# Patient Record
Sex: MALE | Race: WHITE | NOT HISPANIC OR LATINO
[De-identification: names, ages, dates, MRNs, and addresses within clinical notes are randomized per-mention and may not be internally consistent; named-entity substitution may affect disease eponyms.]

---

## 2017-04-24 ENCOUNTER — APPOINTMENT (OUTPATIENT)
Dept: CARDIOLOGY | Facility: CLINIC | Age: 62
End: 2017-04-24

## 2017-04-24 VITALS — HEART RATE: 69 BPM | HEIGHT: 68 IN | WEIGHT: 153 LBS | BODY MASS INDEX: 23.19 KG/M2

## 2017-04-24 LAB
INR PPP: 3.7 RATIO
QUALITY CONTROL: YES

## 2017-05-01 ENCOUNTER — MEDICATION RENEWAL (OUTPATIENT)
Age: 62
End: 2017-05-01

## 2017-05-03 ENCOUNTER — MEDICATION RENEWAL (OUTPATIENT)
Age: 62
End: 2017-05-03

## 2017-08-07 ENCOUNTER — APPOINTMENT (OUTPATIENT)
Dept: CARDIOLOGY | Facility: CLINIC | Age: 62
End: 2017-08-07
Payer: COMMERCIAL

## 2017-08-07 VITALS — BODY MASS INDEX: 23.19 KG/M2 | HEIGHT: 68 IN | HEART RATE: 69 BPM | WEIGHT: 153 LBS | OXYGEN SATURATION: 96 %

## 2017-08-07 LAB
INR PPP: 3.6 RATIO
QUALITY CONTROL: YES

## 2017-08-07 PROCEDURE — 99211 OFF/OP EST MAY X REQ PHY/QHP: CPT

## 2017-08-07 PROCEDURE — 85610 PROTHROMBIN TIME: CPT | Mod: QW

## 2017-08-21 ENCOUNTER — APPOINTMENT (OUTPATIENT)
Dept: CARDIOLOGY | Facility: CLINIC | Age: 62
End: 2017-08-21
Payer: COMMERCIAL

## 2017-08-21 VITALS — WEIGHT: 153 LBS | BODY MASS INDEX: 23.19 KG/M2 | HEART RATE: 69 BPM | HEIGHT: 68 IN

## 2017-08-21 LAB
INR PPP: 3.3 RATIO
QUALITY CONTROL: YES

## 2017-08-21 PROCEDURE — 85610 PROTHROMBIN TIME: CPT | Mod: QW

## 2017-08-21 PROCEDURE — 99211 OFF/OP EST MAY X REQ PHY/QHP: CPT

## 2017-08-22 ENCOUNTER — RX RENEWAL (OUTPATIENT)
Age: 62
End: 2017-08-22

## 2017-08-25 ENCOUNTER — MEDICATION RENEWAL (OUTPATIENT)
Age: 62
End: 2017-08-25

## 2017-08-28 ENCOUNTER — RX RENEWAL (OUTPATIENT)
Age: 62
End: 2017-08-28

## 2017-09-05 ENCOUNTER — APPOINTMENT (OUTPATIENT)
Dept: CARDIOLOGY | Facility: CLINIC | Age: 62
End: 2017-09-05
Payer: COMMERCIAL

## 2017-09-05 VITALS — HEIGHT: 68 IN | BODY MASS INDEX: 23.19 KG/M2 | WEIGHT: 153 LBS | HEART RATE: 69 BPM

## 2017-09-05 LAB
INR PPP: 2.5 RATIO
QUALITY CONTROL: YES

## 2017-09-05 PROCEDURE — 85610 PROTHROMBIN TIME: CPT | Mod: QW

## 2017-09-05 PROCEDURE — 99211 OFF/OP EST MAY X REQ PHY/QHP: CPT

## 2017-09-07 ENCOUNTER — RX RENEWAL (OUTPATIENT)
Age: 62
End: 2017-09-07

## 2017-09-25 ENCOUNTER — RX RENEWAL (OUTPATIENT)
Age: 62
End: 2017-09-25

## 2017-10-09 ENCOUNTER — NON-APPOINTMENT (OUTPATIENT)
Age: 62
End: 2017-10-09

## 2017-10-09 ENCOUNTER — APPOINTMENT (OUTPATIENT)
Dept: CARDIOLOGY | Facility: CLINIC | Age: 62
End: 2017-10-09
Payer: COMMERCIAL

## 2017-10-09 VITALS
HEIGHT: 68 IN | BODY MASS INDEX: 23.95 KG/M2 | WEIGHT: 158 LBS | HEART RATE: 76 BPM | DIASTOLIC BLOOD PRESSURE: 94 MMHG | OXYGEN SATURATION: 96 % | SYSTOLIC BLOOD PRESSURE: 137 MMHG

## 2017-10-09 VITALS — DIASTOLIC BLOOD PRESSURE: 80 MMHG | SYSTOLIC BLOOD PRESSURE: 120 MMHG

## 2017-10-09 DIAGNOSIS — I07.1 RHEUMATIC TRICUSPID INSUFFICIENCY: ICD-10-CM

## 2017-10-09 DIAGNOSIS — R06.02 SHORTNESS OF BREATH: ICD-10-CM

## 2017-10-09 DIAGNOSIS — Z86.73 PERSONAL HISTORY OF TRANSIENT ISCHEMIC ATTACK (TIA), AND CEREBRAL INFARCTION W/OUT RESIDUAL DEFICITS: ICD-10-CM

## 2017-10-09 DIAGNOSIS — R06.89 OTHER ABNORMALITIES OF BREATHING: ICD-10-CM

## 2017-10-09 DIAGNOSIS — I42.9 CARDIOMYOPATHY, UNSPECIFIED: ICD-10-CM

## 2017-10-09 LAB
INR PPP: 1.6 RATIO
QUALITY CONTROL: YES

## 2017-10-09 PROCEDURE — 99215 OFFICE O/P EST HI 40 MIN: CPT

## 2017-10-09 PROCEDURE — 93000 ELECTROCARDIOGRAM COMPLETE: CPT

## 2017-10-23 ENCOUNTER — APPOINTMENT (OUTPATIENT)
Dept: CARDIOLOGY | Facility: CLINIC | Age: 62
End: 2017-10-23
Payer: COMMERCIAL

## 2017-10-23 PROCEDURE — 93283 PRGRMG EVAL IMPLANTABLE DFB: CPT

## 2017-10-31 ENCOUNTER — TRANSCRIPTION ENCOUNTER (OUTPATIENT)
Age: 62
End: 2017-10-31

## 2017-10-31 ENCOUNTER — INPATIENT (INPATIENT)
Facility: HOSPITAL | Age: 62
LOS: 0 days | Discharge: HOME | DRG: 245 | End: 2017-11-01
Attending: INTERNAL MEDICINE | Admitting: INTERNAL MEDICINE
Payer: COMMERCIAL

## 2017-10-31 VITALS
HEART RATE: 71 BPM | RESPIRATION RATE: 18 BRPM | WEIGHT: 149.91 LBS | TEMPERATURE: 98 F | SYSTOLIC BLOOD PRESSURE: 155 MMHG | DIASTOLIC BLOOD PRESSURE: 98 MMHG | HEIGHT: 68 IN | OXYGEN SATURATION: 97 %

## 2017-10-31 DIAGNOSIS — Z45.02 ENCOUNTER FOR ADJUSTMENT AND MANAGEMENT OF AUTOMATIC IMPLANTABLE CARDIAC DEFIBRILLATOR: ICD-10-CM

## 2017-10-31 DIAGNOSIS — G45.9 TRANSIENT CEREBRAL ISCHEMIC ATTACK, UNSPECIFIED: ICD-10-CM

## 2017-10-31 DIAGNOSIS — Z95.810 PRESENCE OF AUTOMATIC (IMPLANTABLE) CARDIAC DEFIBRILLATOR: Chronic | ICD-10-CM

## 2017-10-31 LAB
ALBUMIN SERPL ELPH-MCNC: 4.5 G/DL — SIGNIFICANT CHANGE UP (ref 3.3–5)
ALP SERPL-CCNC: 91 U/L — SIGNIFICANT CHANGE UP (ref 40–120)
ALT FLD-CCNC: 25 U/L RC — SIGNIFICANT CHANGE UP (ref 10–45)
ANION GAP SERPL CALC-SCNC: 11 MMOL/L — SIGNIFICANT CHANGE UP (ref 5–17)
APTT BLD: 35.5 SEC — SIGNIFICANT CHANGE UP (ref 27.5–37.4)
AST SERPL-CCNC: 28 U/L — SIGNIFICANT CHANGE UP (ref 10–40)
BILIRUB SERPL-MCNC: 0.7 MG/DL — SIGNIFICANT CHANGE UP (ref 0.2–1.2)
BUN SERPL-MCNC: 15 MG/DL — SIGNIFICANT CHANGE UP (ref 7–23)
CALCIUM SERPL-MCNC: 9.6 MG/DL — SIGNIFICANT CHANGE UP (ref 8.4–10.5)
CHLORIDE SERPL-SCNC: 103 MMOL/L — SIGNIFICANT CHANGE UP (ref 96–108)
CO2 SERPL-SCNC: 27 MMOL/L — SIGNIFICANT CHANGE UP (ref 22–31)
CREAT SERPL-MCNC: 0.83 MG/DL — SIGNIFICANT CHANGE UP (ref 0.5–1.3)
GLUCOSE SERPL-MCNC: 131 MG/DL — HIGH (ref 70–99)
HCT VFR BLD CALC: 46.3 % — SIGNIFICANT CHANGE UP (ref 39–50)
HGB BLD-MCNC: 16.2 G/DL — SIGNIFICANT CHANGE UP (ref 13–17)
INR BLD: 1.89 RATIO — HIGH (ref 0.88–1.16)
MCHC RBC-ENTMCNC: 33.6 PG — SIGNIFICANT CHANGE UP (ref 27–34)
MCHC RBC-ENTMCNC: 35.1 GM/DL — SIGNIFICANT CHANGE UP (ref 32–36)
MCV RBC AUTO: 95.8 FL — SIGNIFICANT CHANGE UP (ref 80–100)
PLATELET # BLD AUTO: 191 K/UL — SIGNIFICANT CHANGE UP (ref 150–400)
POTASSIUM SERPL-MCNC: 4.8 MMOL/L — SIGNIFICANT CHANGE UP (ref 3.5–5.3)
POTASSIUM SERPL-SCNC: 4.8 MMOL/L — SIGNIFICANT CHANGE UP (ref 3.5–5.3)
PROT SERPL-MCNC: 8 G/DL — SIGNIFICANT CHANGE UP (ref 6–8.3)
PROTHROM AB SERPL-ACNC: 20.9 SEC — HIGH (ref 9.8–12.7)
RBC # BLD: 4.84 M/UL — SIGNIFICANT CHANGE UP (ref 4.2–5.8)
RBC # FLD: 12.3 % — SIGNIFICANT CHANGE UP (ref 10.3–14.5)
SODIUM SERPL-SCNC: 141 MMOL/L — SIGNIFICANT CHANGE UP (ref 135–145)
WBC # BLD: 9.1 K/UL — SIGNIFICANT CHANGE UP (ref 3.8–10.5)
WBC # FLD AUTO: 9.1 K/UL — SIGNIFICANT CHANGE UP (ref 3.8–10.5)

## 2017-10-31 PROCEDURE — 93010 ELECTROCARDIOGRAM REPORT: CPT

## 2017-10-31 PROCEDURE — 33263 RMVL & RPLCMT DFB GEN 2 LEAD: CPT

## 2017-10-31 RX ORDER — VANCOMYCIN HCL 1 G
1000 VIAL (EA) INTRAVENOUS EVERY 12 HOURS
Qty: 0 | Refills: 0 | Status: COMPLETED | OUTPATIENT
Start: 2017-10-31 | End: 2017-11-01

## 2017-10-31 RX ORDER — ASPIRIN/CALCIUM CARB/MAGNESIUM 324 MG
81 TABLET ORAL DAILY
Qty: 0 | Refills: 0 | Status: DISCONTINUED | OUTPATIENT
Start: 2017-10-31 | End: 2017-11-01

## 2017-10-31 RX ORDER — WARFARIN SODIUM 2.5 MG/1
1 TABLET ORAL
Qty: 0 | Refills: 0 | COMMUNITY

## 2017-10-31 RX ORDER — ATORVASTATIN CALCIUM 80 MG/1
1 TABLET, FILM COATED ORAL
Qty: 0 | Refills: 0 | COMMUNITY

## 2017-10-31 RX ORDER — LISINOPRIL 2.5 MG/1
10 TABLET ORAL DAILY
Qty: 0 | Refills: 0 | Status: DISCONTINUED | OUTPATIENT
Start: 2017-10-31 | End: 2017-11-01

## 2017-10-31 RX ORDER — ATORVASTATIN CALCIUM 80 MG/1
20 TABLET, FILM COATED ORAL AT BEDTIME
Qty: 0 | Refills: 0 | Status: DISCONTINUED | OUTPATIENT
Start: 2017-10-31 | End: 2017-11-01

## 2017-10-31 RX ORDER — RAMIPRIL 5 MG
1 CAPSULE ORAL
Qty: 0 | Refills: 0 | COMMUNITY

## 2017-10-31 RX ORDER — ASPIRIN/CALCIUM CARB/MAGNESIUM 324 MG
1 TABLET ORAL
Qty: 0 | Refills: 0 | COMMUNITY

## 2017-10-31 RX ORDER — ACETAMINOPHEN 500 MG
650 TABLET ORAL EVERY 6 HOURS
Qty: 0 | Refills: 0 | Status: DISCONTINUED | OUTPATIENT
Start: 2017-10-31 | End: 2017-11-01

## 2017-10-31 RX ORDER — WARFARIN SODIUM 2.5 MG/1
6 TABLET ORAL ONCE
Qty: 0 | Refills: 0 | Status: COMPLETED | OUTPATIENT
Start: 2017-10-31 | End: 2017-10-31

## 2017-10-31 RX ORDER — CARVEDILOL PHOSPHATE 80 MG/1
1 CAPSULE, EXTENDED RELEASE ORAL
Qty: 0 | Refills: 0 | COMMUNITY

## 2017-10-31 RX ORDER — CARVEDILOL PHOSPHATE 80 MG/1
6.25 CAPSULE, EXTENDED RELEASE ORAL EVERY 12 HOURS
Qty: 0 | Refills: 0 | Status: DISCONTINUED | OUTPATIENT
Start: 2017-10-31 | End: 2017-11-01

## 2017-10-31 RX ORDER — OXYCODONE AND ACETAMINOPHEN 5; 325 MG/1; MG/1
1 TABLET ORAL EVERY 6 HOURS
Qty: 0 | Refills: 0 | Status: DISCONTINUED | OUTPATIENT
Start: 2017-10-31 | End: 2017-11-01

## 2017-10-31 RX ORDER — SODIUM CHLORIDE 9 MG/ML
3 INJECTION INTRAMUSCULAR; INTRAVENOUS; SUBCUTANEOUS EVERY 8 HOURS
Qty: 0 | Refills: 0 | Status: DISCONTINUED | OUTPATIENT
Start: 2017-10-31 | End: 2017-11-01

## 2017-10-31 RX ADMIN — SODIUM CHLORIDE 3 MILLILITER(S): 9 INJECTION INTRAMUSCULAR; INTRAVENOUS; SUBCUTANEOUS at 21:27

## 2017-10-31 RX ADMIN — Medication 250 MILLIGRAM(S): at 21:26

## 2017-10-31 RX ADMIN — CARVEDILOL PHOSPHATE 6.25 MILLIGRAM(S): 80 CAPSULE, EXTENDED RELEASE ORAL at 17:48

## 2017-10-31 RX ADMIN — Medication 650 MILLIGRAM(S): at 13:32

## 2017-10-31 RX ADMIN — OXYCODONE AND ACETAMINOPHEN 1 TABLET(S): 5; 325 TABLET ORAL at 20:00

## 2017-10-31 RX ADMIN — ATORVASTATIN CALCIUM 20 MILLIGRAM(S): 80 TABLET, FILM COATED ORAL at 21:26

## 2017-10-31 RX ADMIN — OXYCODONE AND ACETAMINOPHEN 1 TABLET(S): 5; 325 TABLET ORAL at 19:18

## 2017-10-31 RX ADMIN — Medication 650 MILLIGRAM(S): at 14:32

## 2017-10-31 RX ADMIN — WARFARIN SODIUM 6 MILLIGRAM(S): 2.5 TABLET ORAL at 21:26

## 2017-10-31 RX ADMIN — SODIUM CHLORIDE 3 MILLILITER(S): 9 INJECTION INTRAMUSCULAR; INTRAVENOUS; SUBCUTANEOUS at 12:43

## 2017-10-31 NOTE — H&P CARDIOLOGY - HISTORY OF PRESENT ILLNESS
62 year old male pt with PMHx of HLD, TIA(2008), ischemic cardiomyopathy EF 21%, NSVT with AICD (2008), current cigarette and Marijuana  smoker (thinking about quitting) and  presents for AICD generator change. Currently pt denies chest pain, SOB or palpitation.

## 2017-10-31 NOTE — PROGRESS NOTE ADULT - SUBJECTIVE AND OBJECTIVE BOX
INTERVAL HPI/OVERNIGHT EVENTS:  Resting in bed; (+) mild incisional discomfort; denies sob, chest pain, or dizziness    MEDICATIONS  (STANDING):  aspirin enteric coated 81 milliGRAM(s) Oral daily  atorvastatin 20 milliGRAM(s) Oral at bedtime  carvedilol 6.25 milliGRAM(s) Oral every 12 hours  lisinopril 10 milliGRAM(s) Oral daily  sodium chloride 0.9% lock flush 3 milliLiter(s) IV Push every 8 hours  vancomycin  IVPB 1000 milliGRAM(s) IV Intermittent every 12 hours  warfarin 6 milliGRAM(s) Oral once    MEDICATIONS  (PRN):  acetaminophen   Tablet. 650 milliGRAM(s) Oral every 6 hours PRN Mild Pain (1 - 3)  oxyCODONE    5 mG/acetaminophen 325 mG 1 Tablet(s) Oral every 6 hours PRN Moderate Pain (4 - 6)      Allergies:  penicillin (Unknown)      Vital Signs Last 24 Hrs  T(C): 36.7 (31 Oct 2017 11:55), Max: 36.9 (31 Oct 2017 06:47)  T(F): 98.1 (31 Oct 2017 11:55), Max: 98.5 (31 Oct 2017 06:47)  HR: 70 (31 Oct 2017 16:55) (65 - 78)  BP: 130/81 (31 Oct 2017 16:55) (123/82 - 155/98)  BP(mean): 117 (31 Oct 2017 06:47) (117 - 117)  RR: 16 (31 Oct 2017 16:55) (16 - 18)  SpO2: 99% (31 Oct 2017 16:55) (97% - 100%)  Physical Exam:  Vital Signs : /81   HR70   RR16    Constitutional: well developed, no acute distress  Neurological: Alert & Oriented x 3, URIBE  HEENT: Neck supple  Respiratory: CTA B/L  Cardiovascular: (+) S1 & S2, RRR  Gastrointestinal: soft, NT, nondistended, (+) BS  Genitourinary: non distended bladder, voiding freely  Extremities: No pedal edema, No clubbing, No cyanosis  Skin:  Left infraclavicular ICD wound site beginning puffiness; no active bleeding noted      LABS:                        16.2   9.1   )-----------( 191      ( 31 Oct 2017 06:56 )             46.3     10-31    141  |  103  |  15  ----------------------------<  131<H>  4.8   |  27  |  0.83    Ca    9.6      31 Oct 2017 06:56    TPro  8.0  /  Alb  4.5  /  TBili  0.7  /  DBili  x   /  AST  28  /  ALT  25  /  AlkPhos  91  10-31    PT/INR - ( 31 Oct 2017 06:56 )   PT: 20.9 sec;   INR: 1.89 ratio         PTT - ( 31 Oct 2017 06:56 )  PTT:35.5 sec      RADIOLOGY & ADDITIONAL TESTS:    TELE: NSR in the 70's

## 2017-10-31 NOTE — PROGRESS NOTE ADULT - ASSESSMENT
This is a 61 y/o male patient w/  PMHx of HLD, TIA(2008), ischemic cardiomyopathy EF 21%, NSVT s/p ICD in 2008, current cigarette and Marijuana  smoker (thinking about quitting) with ICD battery depletion now s/p ICD Generator change today (Medtronic [id number]). Patient tolerated procedure without complications.

## 2017-10-31 NOTE — H&P CARDIOLOGY - FAMILY HISTORY
Mother  Still living? No  Family history of stroke, Age at diagnosis: Age Unknown     Father  Still living? No  Family history of heart attack, Age at diagnosis: Age Unknown  Family history of Alzheimer's disease, Age at diagnosis: Age Unknown

## 2017-10-31 NOTE — H&P CARDIOLOGY - PMH
AICD (automatic cardioverter/defibrillator) present    Cardiomyopathy    Marijuana smoker    MI, old    MR (mitral regurgitation)    NSVT (nonsustained ventricular tachycardia)    Smoker    TIA (transient ischemic attack)

## 2017-10-31 NOTE — PROGRESS NOTE ADULT - PROBLEM SELECTOR PLAN 1
Admit to telemetry for arrhythmia monitoring  Monitor ICD wound site for bleeding or hematoma  ICD teachings initiated with patient  Vancomycin 1 Gm IVSS Q12H for 2 doses post op  Percocet prn for pain  Discharge planning in am post last dose of Vancomycin

## 2017-11-01 ENCOUNTER — APPOINTMENT (OUTPATIENT)
Dept: ELECTROPHYSIOLOGY | Facility: CLINIC | Age: 62
End: 2017-11-01

## 2017-11-01 VITALS
DIASTOLIC BLOOD PRESSURE: 77 MMHG | TEMPERATURE: 98 F | RESPIRATION RATE: 17 BRPM | SYSTOLIC BLOOD PRESSURE: 130 MMHG | HEART RATE: 71 BPM | OXYGEN SATURATION: 98 %

## 2017-11-01 LAB
ANION GAP SERPL CALC-SCNC: 12 MMOL/L — SIGNIFICANT CHANGE UP (ref 5–17)
BUN SERPL-MCNC: 16 MG/DL — SIGNIFICANT CHANGE UP (ref 7–23)
CALCIUM SERPL-MCNC: 9.5 MG/DL — SIGNIFICANT CHANGE UP (ref 8.4–10.5)
CHLORIDE SERPL-SCNC: 104 MMOL/L — SIGNIFICANT CHANGE UP (ref 96–108)
CO2 SERPL-SCNC: 26 MMOL/L — SIGNIFICANT CHANGE UP (ref 22–31)
CREAT SERPL-MCNC: 0.85 MG/DL — SIGNIFICANT CHANGE UP (ref 0.5–1.3)
GLUCOSE SERPL-MCNC: 122 MG/DL — HIGH (ref 70–99)
HCT VFR BLD CALC: 45.5 % — SIGNIFICANT CHANGE UP (ref 39–50)
HGB BLD-MCNC: 15.7 G/DL — SIGNIFICANT CHANGE UP (ref 13–17)
MCHC RBC-ENTMCNC: 33.2 PG — SIGNIFICANT CHANGE UP (ref 27–34)
MCHC RBC-ENTMCNC: 34.6 GM/DL — SIGNIFICANT CHANGE UP (ref 32–36)
MCV RBC AUTO: 96 FL — SIGNIFICANT CHANGE UP (ref 80–100)
PLATELET # BLD AUTO: 171 K/UL — SIGNIFICANT CHANGE UP (ref 150–400)
POTASSIUM SERPL-MCNC: 4.5 MMOL/L — SIGNIFICANT CHANGE UP (ref 3.5–5.3)
POTASSIUM SERPL-SCNC: 4.5 MMOL/L — SIGNIFICANT CHANGE UP (ref 3.5–5.3)
RBC # BLD: 4.74 M/UL — SIGNIFICANT CHANGE UP (ref 4.2–5.8)
RBC # FLD: 12.2 % — SIGNIFICANT CHANGE UP (ref 10.3–14.5)
SODIUM SERPL-SCNC: 142 MMOL/L — SIGNIFICANT CHANGE UP (ref 135–145)
WBC # BLD: 10.4 K/UL — SIGNIFICANT CHANGE UP (ref 3.8–10.5)
WBC # FLD AUTO: 10.4 K/UL — SIGNIFICANT CHANGE UP (ref 3.8–10.5)

## 2017-11-01 PROCEDURE — 85730 THROMBOPLASTIN TIME PARTIAL: CPT

## 2017-11-01 PROCEDURE — 93010 ELECTROCARDIOGRAM REPORT: CPT

## 2017-11-01 PROCEDURE — 85027 COMPLETE CBC AUTOMATED: CPT

## 2017-11-01 PROCEDURE — 33263 RMVL & RPLCMT DFB GEN 2 LEAD: CPT

## 2017-11-01 PROCEDURE — 80053 COMPREHEN METABOLIC PANEL: CPT

## 2017-11-01 PROCEDURE — 85610 PROTHROMBIN TIME: CPT

## 2017-11-01 PROCEDURE — 93005 ELECTROCARDIOGRAM TRACING: CPT

## 2017-11-01 PROCEDURE — 80048 BASIC METABOLIC PNL TOTAL CA: CPT

## 2017-11-01 PROCEDURE — C1721: CPT

## 2017-11-01 RX ORDER — ACETAMINOPHEN 500 MG
2 TABLET ORAL
Qty: 0 | Refills: 0 | COMMUNITY
Start: 2017-11-01

## 2017-11-01 RX ADMIN — OXYCODONE AND ACETAMINOPHEN 1 TABLET(S): 5; 325 TABLET ORAL at 11:27

## 2017-11-01 RX ADMIN — Medication 250 MILLIGRAM(S): at 09:39

## 2017-11-01 RX ADMIN — CARVEDILOL PHOSPHATE 6.25 MILLIGRAM(S): 80 CAPSULE, EXTENDED RELEASE ORAL at 05:20

## 2017-11-01 RX ADMIN — Medication 81 MILLIGRAM(S): at 05:20

## 2017-11-01 RX ADMIN — SODIUM CHLORIDE 3 MILLILITER(S): 9 INJECTION INTRAMUSCULAR; INTRAVENOUS; SUBCUTANEOUS at 11:06

## 2017-11-01 RX ADMIN — LISINOPRIL 10 MILLIGRAM(S): 2.5 TABLET ORAL at 05:20

## 2017-11-01 RX ADMIN — OXYCODONE AND ACETAMINOPHEN 1 TABLET(S): 5; 325 TABLET ORAL at 05:20

## 2017-11-01 RX ADMIN — OXYCODONE AND ACETAMINOPHEN 1 TABLET(S): 5; 325 TABLET ORAL at 06:00

## 2017-11-01 RX ADMIN — SODIUM CHLORIDE 3 MILLILITER(S): 9 INJECTION INTRAMUSCULAR; INTRAVENOUS; SUBCUTANEOUS at 05:22

## 2017-11-01 NOTE — DISCHARGE NOTE ADULT - PLAN OF CARE
Your heart rate will be controlled. Continue with follow-up visits to your electrophysiology team and with your home remote device monitoring (if applicable). Continue your medications as prescribed. Your heart rate and rhythm will be controlled. Continue with your cardiologist and primary care MD. Continue your current medications. Call your physician for palpitations, feelings of rapid heart beat, lightheadedness, or dizziness. If you are on warfarin (Coumadin), have your blood work drawn (prescription given) on ________________. Ask your nurse for written material about Coumadin and to provide patient education before discharge.

## 2017-11-01 NOTE — DISCHARGE NOTE ADULT - HOSPITAL COURSE
62 year old male pt with PMHx of HLD, TIA(2008), ischemic cardiomyopathy EF 21%, NSVT with AICD (2008), current cigarette and Marijuana  smoker (thinking about quitting) and  presents for AICD generator change. Currently pt denies chest pain, SOB or palpitation. (31 Oct 2017 06:47).    10/31 AICD generator change. Left chest wall site without swelling, bleeding. 62 year old male pt with PMHx of HLD, TIA(2008), ischemic cardiomyopathy EF 21%, NSVT with AICD (2008), current cigarette and Marijuana  smoker (thinking about quitting) and  presents for AICD generator change. Currently pt denies chest pain, SOB or palpitation. (31 Oct 2017 06:47).    10/31 AICD generator change. Left chest wall site without swelling, bleeding. Patient had his prophylactic antibiotics and was cleared for discharge.

## 2017-11-01 NOTE — PROGRESS NOTE ADULT - ASSESSMENT
This is a 61 y/o male patient w/  PMHx of HLD, TIA(2008), ischemic cardiomyopathy EF 21%, NSVT s/p ICD in 2008, current cigarette and Marijuana  smoker (thinking about quitting) with ICD battery depletion now s/p ICD Generator 10/31.

## 2017-11-01 NOTE — DISCHARGE NOTE ADULT - CARE PROVIDER_API CALL
Klaus Mendoza (MD), Cardiovascular Disease; Internal Medicine  [address] [place] [address]  Phone: [phone]  Fax: [phone]

## 2017-11-01 NOTE — DISCHARGE NOTE ADULT - ADDITIONAL INSTRUCTIONS
Follow up with your cardiologist and primary care provider in 1-2 weeks. Follow up with your cardiologist in the EP clinic at [facility]  at Nov 22, 2017 at 835 am.

## 2017-11-01 NOTE — DISCHARGE NOTE ADULT - MEDICATION SUMMARY - MEDICATIONS TO TAKE
I will START or STAY ON the medications listed below when I get home from the hospital:    Aspirin Enteric Coated 81 mg oral delayed release tablet  -- 1 tab(s) by mouth once a day  -- Indication: For Cardiomyopathy    acetaminophen 325 mg oral tablet  -- 2 tab(s) by mouth every 6 hours, As needed, Mild Pain (1 - 3)  -- Indication: For MIld pain    ramipril 2.5 mg oral tablet  -- 1 tab(s) by mouth once a day  -- Indication: For hypertension    warfarin 3 mg oral tablet  -- 1 tab(s) by mouth 2 times a week  Wed/Sun  -- Indication: For NSVT (nonsustained ventricular tachycardia)    warfarin 6 mg oral tablet  -- 1 tab(s) by mouth 5 times a week  M/T/Th/F/Sat  -- Indication: For NSVT (nonsustained ventricular tachycardia)    atorvastatin 20 mg oral tablet  -- 1 tab(s) by mouth once a day  -- Indication: For hyperlipidemia    Coreg 6.25 mg oral tablet  -- 1 tab(s) by mouth 2 times a day  -- Indication: For hypertension I will START or STAY ON the medications listed below when I get home from the hospital:    Aspirin Enteric Coated 81 mg oral delayed release tablet  -- 1 tab(s) by mouth once a day  -- Indication: For Cardiomyopathy    acetaminophen 325 mg oral tablet  -- 2 tab(s) by mouth every 6 hours, As needed, Mild Pain (1 - 3)  -- Indication: For MIld pain    Endocet 5/325 oral tablet  -- 1 tab(s) by mouth every 6 hours, As needed, Moderate Pain (4 - 6) MDD:4  -- Indication: For moderate to severe pain at the incision site    ramipril 2.5 mg oral tablet  -- 1 tab(s) by mouth once a day  -- Indication: For hypertension    warfarin 3 mg oral tablet  -- 1 tab(s) by mouth 2 times a week  Wed/Sun  -- Indication: For NSVT (nonsustained ventricular tachycardia)    warfarin 6 mg oral tablet  -- 1 tab(s) by mouth 5 times a week  M/T/Th/F/Sat  -- Indication: For NSVT (nonsustained ventricular tachycardia)    atorvastatin 20 mg oral tablet  -- 1 tab(s) by mouth once a day  -- Indication: For hyperlipidemia    Coreg 6.25 mg oral tablet  -- 1 tab(s) by mouth 2 times a day  -- Indication: For hypertension

## 2017-11-01 NOTE — PROGRESS NOTE ADULT - SUBJECTIVE AND OBJECTIVE BOX
INTERVAL HPI/OVERNIGHT EVENTS: Pt resting comfortably with complaint of slight left ICD site pain    MEDICATIONS  (STANDING):  aspirin enteric coated 81 milliGRAM(s) Oral daily  atorvastatin 20 milliGRAM(s) Oral at bedtime  carvedilol 6.25 milliGRAM(s) Oral every 12 hours  lisinopril 10 milliGRAM(s) Oral daily  sodium chloride 0.9% lock flush 3 milliLiter(s) IV Push every 8 hours    MEDICATIONS  (PRN):  acetaminophen   Tablet. 650 milliGRAM(s) Oral every 6 hours PRN Mild Pain (1 - 3)  oxyCODONE    5 mG/acetaminophen 325 mG 1 Tablet(s) Oral every 6 hours PRN Moderate Pain (4 - 6)      Allergies    penicillin (Unknown)    Intolerances      ROS:  General: Pt denies fever/chills  Cardiovascular: denies chest pain/palpitations/leg edema  Respiratory and Thorax: denies SOB/cough/wheezing  Gastrointestinal: denies abdominal pain/diarrhea/constipation/bloody stool  Musculoskeletal: denies joint pain or swelling, denies restricted motion  Skin: denies rashes/sores  Hematologic: denies abnormal bleeding    Vital Signs Last 24 Hrs  T(C): 36.9 (01 Nov 2017 04:50), Max: 37.1 (31 Oct 2017 20:18)  T(F): 98.4 (01 Nov 2017 04:50), Max: 98.7 (31 Oct 2017 20:18)  HR: 72 (01 Nov 2017 04:50) (65 - 80)  BP: 141/91 (01 Nov 2017 04:50) (123/82 - 144/90)  BP(mean): --  RR: 17 (01 Nov 2017 04:50) (16 - 18)  SpO2: 97% (01 Nov 2017 04:50) (97% - 100%)      Physical Exam:  Neurological: Alert & Oriented x 3  Respiratory: CTA B/L, No wheezing/crackles/rhonchi  Cardiovascular: (+) S1 & S2  Gastrointestinal: soft, NT, nondistended, (+) BS  Genitourinary: non distended bladder, voiding freely  Extremities: No pedal edema, No clubbing, No cyanosis  Skin:  Left infraclavisulr site with + ecchymosis and slight swelling. Pressure dsg in place.     LABS:                        15.7   10.4  )-----------( 171      ( 01 Nov 2017 03:31 )             45.5     11-01    142  |  104  |  16  ----------------------------<  122<H>  4.5   |  26  |  0.85    Ca    9.5      01 Nov 2017 03:31    TPro  8.0  /  Alb  4.5  /  TBili  0.7  /  DBili  x   /  AST  28  /  ALT  25  /  AlkPhos  91  10-31    PT/INR - ( 31 Oct 2017 06:56 )   PT: 20.9 sec;   INR: 1.89 ratio         PTT - ( 31 Oct 2017 06:56 )  PTT:35.5 sec      RADIOLOGY & ADDITIONAL TESTS:    TELE: SR// Apaced with heart rate 60- 70's

## 2017-11-01 NOTE — DISCHARGE NOTE ADULT - CARE PLAN
Principal Discharge DX:	Elective replacement indicated for implantable cardioverter-defibrillator (ICD)  Goal:	Your heart rate will be controlled.  Instructions for follow-up, activity and diet:	Continue with follow-up visits to your electrophysiology team and with your home remote device monitoring (if applicable). Continue your medications as prescribed.  Secondary Diagnosis:	NSVT (nonsustained ventricular tachycardia)  Goal:	Your heart rate and rhythm will be controlled.  Instructions for follow-up, activity and diet:	Continue with your cardiologist and primary care MD. Continue your current medications. Call your physician for palpitations, feelings of rapid heart beat, lightheadedness, or dizziness. If you are on warfarin (Coumadin), have your blood work drawn (prescription given) on ________________. Ask your nurse for written material about Coumadin and to provide patient education before discharge.

## 2017-11-01 NOTE — PROGRESS NOTE ADULT - PROBLEM SELECTOR PLAN 1
S/P ICD gen change  (Medtronic [id number]) 10/31  Monitor ICD wound site for bleeding or hematoma  ICD teachings reinforced with pt/ family member at bedside  Complete Vancomycin regimen for ppm prophylaxis  Percocet prn for pain management  Discharge planning today after last dose of antibiotics, F/U with EP clinic for wound/ device check on 11/22 @ 8: 35 am.

## 2017-11-15 ENCOUNTER — NON-APPOINTMENT (OUTPATIENT)
Age: 62
End: 2017-11-15

## 2017-11-15 ENCOUNTER — APPOINTMENT (OUTPATIENT)
Dept: ELECTROPHYSIOLOGY | Facility: CLINIC | Age: 62
End: 2017-11-15
Payer: COMMERCIAL

## 2017-11-15 VITALS — HEART RATE: 78 BPM | OXYGEN SATURATION: 98 % | SYSTOLIC BLOOD PRESSURE: 138 MMHG | DIASTOLIC BLOOD PRESSURE: 87 MMHG

## 2017-11-15 PROCEDURE — 99024 POSTOP FOLLOW-UP VISIT: CPT

## 2017-12-12 ENCOUNTER — MEDICATION RENEWAL (OUTPATIENT)
Age: 62
End: 2017-12-12

## 2017-12-13 ENCOUNTER — RX RENEWAL (OUTPATIENT)
Age: 62
End: 2017-12-13

## 2018-01-08 ENCOUNTER — RX RENEWAL (OUTPATIENT)
Age: 63
End: 2018-01-08

## 2018-03-05 ENCOUNTER — APPOINTMENT (OUTPATIENT)
Dept: CARDIOLOGY | Facility: CLINIC | Age: 63
End: 2018-03-05
Payer: COMMERCIAL

## 2018-03-05 VITALS — WEIGHT: 158 LBS | HEIGHT: 68 IN | HEART RATE: 78 BPM | BODY MASS INDEX: 23.95 KG/M2

## 2018-03-05 LAB
INR PPP: 2.1 RATIO
QUALITY CONTROL: YES

## 2018-03-05 PROCEDURE — 85610 PROTHROMBIN TIME: CPT | Mod: QW

## 2018-03-05 PROCEDURE — 99211 OFF/OP EST MAY X REQ PHY/QHP: CPT

## 2018-04-10 ENCOUNTER — APPOINTMENT (OUTPATIENT)
Dept: CARDIOLOGY | Facility: CLINIC | Age: 63
End: 2018-04-10
Payer: COMMERCIAL

## 2018-04-10 VITALS — WEIGHT: 158 LBS | HEART RATE: 78 BPM | HEIGHT: 68 IN | BODY MASS INDEX: 23.95 KG/M2

## 2018-04-10 LAB
INR PPP: 2.4 RATIO
QUALITY CONTROL: YES

## 2018-04-10 PROCEDURE — 99211 OFF/OP EST MAY X REQ PHY/QHP: CPT

## 2018-04-10 PROCEDURE — 85610 PROTHROMBIN TIME: CPT | Mod: QW

## 2018-05-14 ENCOUNTER — RX RENEWAL (OUTPATIENT)
Age: 63
End: 2018-05-14

## 2018-07-25 PROBLEM — F17.200 NICOTINE DEPENDENCE, UNSPECIFIED, UNCOMPLICATED: Chronic | Status: ACTIVE | Noted: 2017-10-31

## 2018-07-25 PROBLEM — I34.0 NONRHEUMATIC MITRAL (VALVE) INSUFFICIENCY: Chronic | Status: ACTIVE | Noted: 2017-10-31

## 2018-07-25 PROBLEM — I47.2 VENTRICULAR TACHYCARDIA: Chronic | Status: ACTIVE | Noted: 2017-10-31

## 2018-07-25 PROBLEM — I25.2 OLD MYOCARDIAL INFARCTION: Chronic | Status: ACTIVE | Noted: 2017-10-31

## 2018-07-25 PROBLEM — G45.9 TRANSIENT CEREBRAL ISCHEMIC ATTACK, UNSPECIFIED: Chronic | Status: ACTIVE | Noted: 2017-10-31

## 2018-07-25 PROBLEM — I42.9 CARDIOMYOPATHY, UNSPECIFIED: Chronic | Status: ACTIVE | Noted: 2017-10-31

## 2018-07-25 PROBLEM — Z95.810 PRESENCE OF AUTOMATIC (IMPLANTABLE) CARDIAC DEFIBRILLATOR: Chronic | Status: ACTIVE | Noted: 2017-10-31

## 2018-07-25 PROBLEM — F12.20 CANNABIS DEPENDENCE, UNCOMPLICATED: Chronic | Status: ACTIVE | Noted: 2017-10-31

## 2018-07-30 ENCOUNTER — APPOINTMENT (OUTPATIENT)
Dept: CARDIOLOGY | Facility: CLINIC | Age: 63
End: 2018-07-30

## 2018-08-20 ENCOUNTER — APPOINTMENT (OUTPATIENT)
Dept: CARDIOLOGY | Facility: CLINIC | Age: 63
End: 2018-08-20
Payer: COMMERCIAL

## 2018-08-20 PROCEDURE — 93283 PRGRMG EVAL IMPLANTABLE DFB: CPT

## 2018-09-04 ENCOUNTER — APPOINTMENT (OUTPATIENT)
Dept: CARDIOLOGY | Facility: CLINIC | Age: 63
End: 2018-09-04
Payer: COMMERCIAL

## 2018-09-04 VITALS — BODY MASS INDEX: 23.95 KG/M2 | HEART RATE: 78 BPM | HEIGHT: 68 IN | WEIGHT: 158 LBS

## 2018-09-04 LAB
INR PPP: 1.8 RATIO
QUALITY CONTROL: YES

## 2018-09-04 PROCEDURE — 85610 PROTHROMBIN TIME: CPT | Mod: QW

## 2018-09-04 PROCEDURE — 93793 ANTICOAG MGMT PT WARFARIN: CPT

## 2018-10-08 ENCOUNTER — APPOINTMENT (OUTPATIENT)
Dept: CARDIOLOGY | Facility: CLINIC | Age: 63
End: 2018-10-08
Payer: COMMERCIAL

## 2018-10-08 VITALS — BODY MASS INDEX: 23.95 KG/M2 | HEIGHT: 68 IN | HEART RATE: 78 BPM | WEIGHT: 158 LBS

## 2018-10-08 LAB
INR PPP: 2.6 RATIO
QUALITY CONTROL: YES

## 2018-10-08 PROCEDURE — 85610 PROTHROMBIN TIME: CPT | Mod: QW

## 2018-10-08 PROCEDURE — 93793 ANTICOAG MGMT PT WARFARIN: CPT

## 2018-12-03 ENCOUNTER — APPOINTMENT (OUTPATIENT)
Dept: CARDIOLOGY | Facility: CLINIC | Age: 63
End: 2018-12-03
Payer: COMMERCIAL

## 2018-12-03 PROCEDURE — 93283 PRGRMG EVAL IMPLANTABLE DFB: CPT

## 2018-12-07 ENCOUNTER — RX RENEWAL (OUTPATIENT)
Age: 63
End: 2018-12-07

## 2019-02-04 ENCOUNTER — RX RENEWAL (OUTPATIENT)
Age: 64
End: 2019-02-04

## 2019-02-04 ENCOUNTER — MEDICATION RENEWAL (OUTPATIENT)
Age: 64
End: 2019-02-04

## 2019-02-18 ENCOUNTER — APPOINTMENT (OUTPATIENT)
Dept: CARDIOLOGY | Facility: CLINIC | Age: 64
End: 2019-02-18
Payer: MEDICARE

## 2019-02-18 ENCOUNTER — NON-APPOINTMENT (OUTPATIENT)
Age: 64
End: 2019-02-18

## 2019-02-18 VITALS
OXYGEN SATURATION: 95 % | BODY MASS INDEX: 23.95 KG/M2 | HEIGHT: 68 IN | WEIGHT: 158 LBS | DIASTOLIC BLOOD PRESSURE: 80 MMHG | HEART RATE: 84 BPM | SYSTOLIC BLOOD PRESSURE: 149 MMHG

## 2019-02-18 DIAGNOSIS — I47.2 VENTRICULAR TACHYCARDIA: ICD-10-CM

## 2019-02-18 PROCEDURE — 99215 OFFICE O/P EST HI 40 MIN: CPT

## 2019-02-18 PROCEDURE — 85610 PROTHROMBIN TIME: CPT | Mod: QW

## 2019-02-18 PROCEDURE — 93000 ELECTROCARDIOGRAM COMPLETE: CPT

## 2019-02-18 NOTE — DISCUSSION/SUMMARY
[FreeTextEntry1] : Mr. Mckeon has a history of an ischemic cardiomyopathy, non-sustained ventricular tachycardia, and TIA's. He has been stable from a cardiac symptomatic standpoint since his previous visit here on 10/9/17.  Specifically, he does not describe having experienced any signs or symptoms to suggest the development of an anginal syndrome, congestive heart failure, or a hemodynamically-compromising arrhythmia. His cardiac examination today is unremarkable, and specifically, he is not exhibiting evidence of congestive heart failure on examination. His blood pressure reading was elevated upon initial presentation to the office today, however, a follow-up measurement obtained after his examination revealed the reading to be normal. His electrocardiogram today reveals sinus rhythm at a rate of 77 beats per minute, poor R-wave progression in leads V1-4 compatible with an anterior infarction of undetermined age, an intraventricular conduction delay, and non-specific ST-T wave abnormalities, essentially unchanged from his previous office tracing, with the exception of no ventricular premature contractions noted on the present tracing.\par \par As he has been stable from a cardiac perspective since his previous visit here, I have instructed the patient to continue his cardiac medications as presently prescribed for the time being.\par \par A fingerstick INR measurement performed in the office today revealed leading to be 2.0. The patient was instructed to take Coumadin 3 mg 1 day per week and 6 mg 6 days per week, and to have a follow-up INR measurement performed in 4 weeks for reassessment.\par \par The patient is going to schedule a follow-up interrogation of his ICD/pacemaker device in our office for April of 2019.\par \par I am referring the patient for a follow-up echocardiography to reassess left ventricular and valvular function. In addition, pharmacological nuclear stress testing will be performed to more objectively assess the status of his ischemic heart disease. The patient is going to make arrangements to have these studies performed through our office, and I will telephone him to discuss the findings, once the studies have been completed.\par \par I am referring the patient for a follow-up fasting lipid profile and chemistry screen to assess the efficacy of the present dosage of Lipitor in optimizing lipid profile, as well as to check for any potential adverse side effects on the liver.\par \par The importance of smoking cessation, proper dietary habits, properly maintenance, and regular exercise was emphasized to the patient today.\par \par I have asked the patient to call me if he should have any questions or problems pertaining to these matters, and especially if he should experience any concerning symptoms. Further recommendations regarding cardiac evaluation and/or treatment will be considered, pending the patient's clinical course, as well as the findings on the aforementioned cardiovascular studies.

## 2019-02-18 NOTE — HISTORY OF PRESENT ILLNESS
[FreeTextEntry1] : Mr. Osvaldo Mckeon presented to the office today for follow-up cardiac evaluation.\par \par The patient is a 64-year-old male with a history of an ischemic cardiomyopathy, non-sustained ventricular tachycardia, and implantable cardiac defibrillator/pacemaker (initially implanted 6/11/08, most recent generator change 10/31/17), transient ischemic attacks (last having occurred on  4/5/08), left ventricular apical mural thrombus formation (detected on cardiac catheterization on 4/29/08, at which point anticoagulation was initiated), mild mitral regurgitation, mild to moderate tricuspid regurgitation, mild carotid atherosclerosis, and a dyslipidemia. I last evaluated the patient in our office on 10/9/17.\par \par The patient has been stable from a cardiac symptomatic standpoint since his previous visit here on 10/9/17. Specifically, he has continued to occasionally note mild dyspnea on exertion in association with activities such as gardening and/or climbing stairs, however, not typically in association with his routine activities. He has not experienced any symptoms of chest discomfort in association with his activities. He has not noted orthopnea, paroxysmal nocturnal dyspnea, or lower extremity edema. He has occasionally experienced very brief palpitations, lasting for just a few seconds. He has not experienced any sustained episodes of palpitations. He has not received any recognized shocks from his ICD device. He has not experienced any episodes of presyncope or syncope. He has not experienced recurrence of expressive aphasia or focal neurological deficits.\par \par The patient most recently had his Medtronic ICD/pacemaker device interrogated in our office on 12/3/18, revealing the device to be functioning properly in the DDI mode with a lower rate limit of 60 beats per minute. Satisfactory thresholds and battery voltage were demonstrated. 3 episodes of non-sustained ventricular tachycardia had been detected since the previous interrogation, with the longest episode having persisted for 6 seconds on 4/616. No delivered therapies had occurred from the device.\par \par Echocardiography most recently performed in our office on 9/19/16 revealed the left ventricle to be mildly dilated with normal wall thickness. Global left ventricular hypokinesis was exhibited, with an estimated ejection fraction of 30%. There was evidence for mild left ventricular diastolic dysfunction. Mild mitral regurgitation and mild to moderate tricuspid regurgitation were demonstrated, with an estimated right ventricular systolic pressure of 30 mm mercury.\par \par A fingerstick INR measurement performed in the office today revealed the reading to be 2.0. The patient was instructed to take Coumadin 3 mg 1 day per week and 6 mg 6 days per week, and to have a follow-up INR measurement performed in 4 weeks for reassessment.\par \par Previous History:\par \par I initially evaluated the patient in cardiology consultation on 4/10/08, noting that he had presented to the office for further evaluation of a recent TIA. Specifically, on 4/5/08, he developed expressive aphasia and right-sided weakness, which persisted for approximately 30 minutes (in retrospect, he had experienced an episode of expressive aphasia 2-3 years earlier, which had persisted for approximately one hour). He was admitted to [facility] on 4/5/08, at which time he was diagnosed as having had a TIA. His neurological workup was reportedly unrevealing. His evaluation at that time but is unrevealing for any evidence of an acute coronary syndrome or significant arrhythmia. His electrocardiogram, however, was suggestive of an anterior infarction of undetermined age. \par \par Echocardiography performed on 4/28/08 revealed evidence for severe global left ventricular systolic dysfunction.\par \par Carotid artery Doppler testing performed on 4/29/08 revealed mild plaque formation bilaterally, however, no significant stenoses were demonstrated.\par \par Nuclear stress testing performed on 5/5/08 revealed evidence for infarction involving the anteroapical and inferior regions, with a calculated left ventricular ejection fraction of 21%.\par \par Coronary angiography performed on 5/13/08 revealed the left main coronary artery to be normal. The left anterior descending artery was occluded in the mid-portion. There was a 90% stenosis involving the second obtuse marginal branch of the circumflex artery. There was proximal occlusion of the right coronary artery. Left ventriculography revealed severe diffuse left ventricular hypokinesis with evidence of an apical mural thrombus. The patient was started on anticoagulation (Coumadin) at that time, in view of the finding of a left ventricular apical thrombus, with a history of a recent TIA.\par \par A thallium myocardial viability study performed on 5/14/08 revealed extensive regions of infarction, without evidence of viability. Therefore, coronary revascularization was not pursued.\par \par A Holter monitor study performed from 5/28/08 through 5/29/08 revealed an episode of non-sustained ventricular tachycardia.\par \par The patient underwent implantation of an ICD/pacemaker device on 6/11/08.\par \par As far as risk factors for coronary artery disease are concerned, the patient has a history of a dyslipidemia. He does not have a history of diabetes or hypertension. He has a history of cigarette smoking, having initially discontinued smoking on 4/5608, after having previously smoked an average of one pack per day for 40 years, and then subsequently resuming smoking an average of half a pack per day. He does not have a known immediate family history of premature coronary artery disease.

## 2019-02-18 NOTE — REASON FOR VISIT
[Coronary Artery Disease] : coronary artery disease [Ventricular Tachycardia] : ventricular tachycardia

## 2019-02-18 NOTE — PHYSICAL EXAM
[General Appearance - Well Developed] : well developed [General Appearance - In No Acute Distress] : no acute distress [Normal Conjunctiva] : the conjunctiva exhibited no abnormalities [No Oral Pallor] : no oral pallor [Respiration, Rhythm And Depth] : normal respiratory rhythm and effort [Exaggerated Use Of Accessory Muscles For Inspiration] : no accessory muscle use [Auscultation Breath Sounds / Voice Sounds] : lungs were clear to auscultation bilaterally [Bowel Sounds] : normal bowel sounds [Abdomen Tenderness] : non-tender [Abnormal Walk] : normal gait [Cyanosis, Localized] : no localized cyanosis [] : no rash [Oriented To Time, Place, And Person] : oriented to person, place, and time [Not Palpable] : not palpable [No Precordial Heave] : no precordial heave was noted [Normal Rate] : normal [Rhythm Regular] : regular [Normal S1] : normal S1 [Normal S2] : normal S2 [No Gallop] : no gallop heard [No Murmur] : no murmurs heard [2+] : left 2+ [No Abnormalities] : the abdominal aorta was not enlarged and no bruit was heard [No Pitting Edema] : no pitting edema present [FreeTextEntry1] : No JVD is appreciated at a 45° angle. [Apical Thrill] : no thrill palpable at the apex [Click] : no click [Pericardial Rub] : no pericardial rub [Right Carotid Bruit] : no bruit heard over the right carotid [Left Carotid Bruit] : no bruit heard over the left carotid

## 2019-02-19 LAB
INR PPP: 2 RATIO
QUALITY CONTROL: YES

## 2019-03-15 ENCOUNTER — RX RENEWAL (OUTPATIENT)
Age: 64
End: 2019-03-15

## 2019-04-01 ENCOUNTER — APPOINTMENT (OUTPATIENT)
Dept: CARDIOLOGY | Facility: CLINIC | Age: 64
End: 2019-04-01
Payer: MEDICARE

## 2019-04-01 PROCEDURE — 93283 PRGRMG EVAL IMPLANTABLE DFB: CPT

## 2019-05-06 ENCOUNTER — RX RENEWAL (OUTPATIENT)
Age: 64
End: 2019-05-06

## 2019-05-15 ENCOUNTER — RX RENEWAL (OUTPATIENT)
Age: 64
End: 2019-05-15

## 2019-06-08 ENCOUNTER — RX RENEWAL (OUTPATIENT)
Age: 64
End: 2019-06-08

## 2019-08-12 ENCOUNTER — APPOINTMENT (OUTPATIENT)
Dept: CARDIOLOGY | Facility: CLINIC | Age: 64
End: 2019-08-12
Payer: MEDICARE

## 2019-08-12 VITALS — SYSTOLIC BLOOD PRESSURE: 120 MMHG | HEART RATE: 84 BPM | OXYGEN SATURATION: 96 % | DIASTOLIC BLOOD PRESSURE: 80 MMHG

## 2019-08-12 LAB
INR PPP: 2.5 RATIO
QUALITY CONTROL: YES

## 2019-08-12 PROCEDURE — 93793 ANTICOAG MGMT PT WARFARIN: CPT

## 2019-08-12 PROCEDURE — 85610 PROTHROMBIN TIME: CPT | Mod: QW

## 2019-08-13 ENCOUNTER — OTHER (OUTPATIENT)
Age: 64
End: 2019-08-13

## 2019-09-09 ENCOUNTER — APPOINTMENT (OUTPATIENT)
Dept: CARDIOLOGY | Facility: CLINIC | Age: 64
End: 2019-09-09
Payer: MEDICARE

## 2019-09-09 PROCEDURE — 93283 PRGRMG EVAL IMPLANTABLE DFB: CPT

## 2019-09-16 ENCOUNTER — APPOINTMENT (OUTPATIENT)
Dept: CARDIOLOGY | Facility: CLINIC | Age: 64
End: 2019-09-16
Payer: MEDICARE

## 2019-09-16 VITALS — BODY MASS INDEX: 23.95 KG/M2 | HEART RATE: 84 BPM | WEIGHT: 158 LBS | HEIGHT: 68 IN

## 2019-09-16 LAB
INR PPP: 3 RATIO
QUALITY CONTROL: YES

## 2019-09-16 PROCEDURE — 93793 ANTICOAG MGMT PT WARFARIN: CPT

## 2019-09-16 PROCEDURE — 85610 PROTHROMBIN TIME: CPT | Mod: QW

## 2019-09-24 ENCOUNTER — APPOINTMENT (OUTPATIENT)
Dept: CARDIOLOGY | Facility: CLINIC | Age: 64
End: 2019-09-24
Payer: MEDICARE

## 2019-09-24 PROCEDURE — 93880 EXTRACRANIAL BILAT STUDY: CPT

## 2019-10-07 ENCOUNTER — APPOINTMENT (OUTPATIENT)
Dept: CARDIOLOGY | Facility: CLINIC | Age: 64
End: 2019-10-07
Payer: MEDICARE

## 2019-10-07 PROCEDURE — 78452 HT MUSCLE IMAGE SPECT MULT: CPT

## 2019-10-07 PROCEDURE — A9500: CPT

## 2019-10-07 PROCEDURE — 93015 CV STRESS TEST SUPVJ I&R: CPT

## 2019-10-21 ENCOUNTER — APPOINTMENT (OUTPATIENT)
Dept: CARDIOLOGY | Facility: CLINIC | Age: 64
End: 2019-10-21
Payer: MEDICARE

## 2019-10-21 VITALS — HEART RATE: 84 BPM | HEIGHT: 68 IN | WEIGHT: 158 LBS | BODY MASS INDEX: 23.95 KG/M2

## 2019-10-21 LAB
INR PPP: 2.6 RATIO
QUALITY CONTROL: YES

## 2019-10-21 PROCEDURE — 93793 ANTICOAG MGMT PT WARFARIN: CPT

## 2019-10-21 PROCEDURE — 85610 PROTHROMBIN TIME: CPT | Mod: QW

## 2019-11-04 ENCOUNTER — APPOINTMENT (OUTPATIENT)
Dept: CARDIOLOGY | Facility: CLINIC | Age: 64
End: 2019-11-04
Payer: MEDICARE

## 2019-11-04 PROCEDURE — 93306 TTE W/DOPPLER COMPLETE: CPT

## 2019-12-16 ENCOUNTER — APPOINTMENT (OUTPATIENT)
Dept: CARDIOLOGY | Facility: CLINIC | Age: 64
End: 2019-12-16
Payer: MEDICARE

## 2019-12-16 VITALS — WEIGHT: 158 LBS | HEIGHT: 68 IN | HEART RATE: 84 BPM | BODY MASS INDEX: 23.95 KG/M2

## 2019-12-16 LAB
INR PPP: 2.7 RATIO
QUALITY CONTROL: YES

## 2019-12-16 PROCEDURE — 85610 PROTHROMBIN TIME: CPT | Mod: QW

## 2019-12-16 PROCEDURE — 93793 ANTICOAG MGMT PT WARFARIN: CPT

## 2020-01-13 ENCOUNTER — APPOINTMENT (OUTPATIENT)
Dept: CARDIOLOGY | Facility: CLINIC | Age: 65
End: 2020-01-13
Payer: MEDICARE

## 2020-01-13 VITALS — HEIGHT: 68 IN | HEART RATE: 84 BPM | BODY MASS INDEX: 23.95 KG/M2 | WEIGHT: 158 LBS

## 2020-01-13 LAB
INR PPP: 3.4 RATIO
QUALITY CONTROL: YES

## 2020-01-13 PROCEDURE — 85610 PROTHROMBIN TIME: CPT | Mod: QW

## 2020-01-13 PROCEDURE — 93793 ANTICOAG MGMT PT WARFARIN: CPT

## 2020-01-27 ENCOUNTER — APPOINTMENT (OUTPATIENT)
Dept: CARDIOLOGY | Facility: CLINIC | Age: 65
End: 2020-01-27
Payer: MEDICARE

## 2020-01-27 PROCEDURE — 93283 PRGRMG EVAL IMPLANTABLE DFB: CPT

## 2020-03-18 ENCOUNTER — RX RENEWAL (OUTPATIENT)
Age: 65
End: 2020-03-18

## 2020-06-02 ENCOUNTER — RX RENEWAL (OUTPATIENT)
Age: 65
End: 2020-06-02

## 2020-06-25 ENCOUNTER — APPOINTMENT (OUTPATIENT)
Dept: CARDIOLOGY | Facility: CLINIC | Age: 65
End: 2020-06-25
Payer: MEDICARE

## 2020-06-25 VITALS — OXYGEN SATURATION: 97 % | HEART RATE: 85 BPM | HEIGHT: 68 IN | RESPIRATION RATE: 14 BRPM

## 2020-06-25 DIAGNOSIS — Z79.01 LONG TERM (CURRENT) USE OF ANTICOAGULANTS: ICD-10-CM

## 2020-06-25 DIAGNOSIS — Z51.81 ENCOUNTER FOR THERAPEUTIC DRUG LVL MONITORING: ICD-10-CM

## 2020-06-25 DIAGNOSIS — Z79.01 ENCOUNTER FOR THERAPEUTIC DRUG LVL MONITORING: ICD-10-CM

## 2020-06-25 LAB
INR PPP: 2.6 RATIO
QUALITY CONTROL: YES

## 2020-06-25 PROCEDURE — 93793 ANTICOAG MGMT PT WARFARIN: CPT

## 2020-06-25 PROCEDURE — 85610 PROTHROMBIN TIME: CPT | Mod: QW

## 2020-07-10 ENCOUNTER — RX RENEWAL (OUTPATIENT)
Age: 65
End: 2020-07-10

## 2020-07-21 ENCOUNTER — APPOINTMENT (OUTPATIENT)
Dept: CARDIOLOGY | Facility: CLINIC | Age: 65
End: 2020-07-21
Payer: MEDICARE

## 2020-07-21 VITALS — RESPIRATION RATE: 14 BRPM | HEIGHT: 68 IN | BODY MASS INDEX: 23.95 KG/M2 | HEART RATE: 80 BPM | WEIGHT: 158 LBS

## 2020-07-21 LAB
INR PPP: 2.4 RATIO
QUALITY CONTROL: YES

## 2020-07-21 PROCEDURE — 93793 ANTICOAG MGMT PT WARFARIN: CPT

## 2020-07-21 PROCEDURE — 85610 PROTHROMBIN TIME: CPT | Mod: QW

## 2020-09-29 ENCOUNTER — APPOINTMENT (OUTPATIENT)
Dept: CARDIOLOGY | Facility: CLINIC | Age: 65
End: 2020-09-29
Payer: MEDICARE

## 2020-09-29 VITALS — WEIGHT: 58 LBS | RESPIRATION RATE: 16 BRPM | BODY MASS INDEX: 11.39 KG/M2 | HEIGHT: 60 IN

## 2020-09-29 LAB
INR PPP: 1.7 RATIO
QUALITY CONTROL: YES

## 2020-09-29 PROCEDURE — 93793 ANTICOAG MGMT PT WARFARIN: CPT

## 2020-09-29 PROCEDURE — 85610 PROTHROMBIN TIME: CPT | Mod: QW

## 2020-10-13 ENCOUNTER — APPOINTMENT (OUTPATIENT)
Dept: CARDIOLOGY | Facility: CLINIC | Age: 65
End: 2020-10-13
Payer: MEDICARE

## 2020-10-13 VITALS — HEIGHT: 60 IN | HEART RATE: 80 BPM | RESPIRATION RATE: 14 BRPM

## 2020-10-13 LAB
INR PPP: 1.4 RATIO
QUALITY CONTROL: YES

## 2020-10-13 PROCEDURE — 93793 ANTICOAG MGMT PT WARFARIN: CPT

## 2020-10-13 PROCEDURE — 85610 PROTHROMBIN TIME: CPT | Mod: QW

## 2020-10-20 ENCOUNTER — APPOINTMENT (OUTPATIENT)
Dept: CARDIOLOGY | Facility: CLINIC | Age: 65
End: 2020-10-20
Payer: MEDICARE

## 2020-10-20 VITALS — HEIGHT: 60 IN | HEART RATE: 80 BPM | RESPIRATION RATE: 14 BRPM

## 2020-10-20 LAB
INR PPP: 1.8 RATIO
QUALITY CONTROL: YES

## 2020-10-20 PROCEDURE — 93793 ANTICOAG MGMT PT WARFARIN: CPT

## 2020-10-20 PROCEDURE — 85610 PROTHROMBIN TIME: CPT | Mod: QW

## 2020-10-20 PROCEDURE — 99072 ADDL SUPL MATRL&STAF TM PHE: CPT

## 2020-11-03 ENCOUNTER — APPOINTMENT (OUTPATIENT)
Dept: CARDIOLOGY | Facility: CLINIC | Age: 65
End: 2020-11-03
Payer: MEDICARE

## 2020-11-03 VITALS — RESPIRATION RATE: 14 BRPM | HEIGHT: 60 IN | HEART RATE: 80 BPM

## 2020-11-03 LAB
INR PPP: 2.6 RATIO
QUALITY CONTROL: YES

## 2020-11-03 PROCEDURE — 85610 PROTHROMBIN TIME: CPT | Mod: QW

## 2020-11-03 PROCEDURE — 93793 ANTICOAG MGMT PT WARFARIN: CPT

## 2020-11-03 PROCEDURE — 99072 ADDL SUPL MATRL&STAF TM PHE: CPT

## 2020-12-08 ENCOUNTER — APPOINTMENT (OUTPATIENT)
Dept: CARDIOLOGY | Facility: CLINIC | Age: 65
End: 2020-12-08
Payer: MEDICARE

## 2020-12-08 VITALS — RESPIRATION RATE: 14 BRPM | HEART RATE: 80 BPM | HEIGHT: 60 IN

## 2020-12-08 LAB
INR PPP: 2.2 RATIO
QUALITY CONTROL: YES

## 2020-12-08 PROCEDURE — 93793 ANTICOAG MGMT PT WARFARIN: CPT

## 2020-12-08 PROCEDURE — 85610 PROTHROMBIN TIME: CPT | Mod: QW

## 2020-12-08 PROCEDURE — 99072 ADDL SUPL MATRL&STAF TM PHE: CPT

## 2021-01-07 ENCOUNTER — NON-APPOINTMENT (OUTPATIENT)
Age: 66
End: 2021-01-07

## 2021-01-19 ENCOUNTER — APPOINTMENT (OUTPATIENT)
Dept: CARDIOLOGY | Facility: CLINIC | Age: 66
End: 2021-01-19
Payer: MEDICARE

## 2021-01-19 VITALS — RESPIRATION RATE: 14 BRPM | HEIGHT: 68 IN | WEIGHT: 158 LBS | BODY MASS INDEX: 23.95 KG/M2

## 2021-01-19 LAB
INR PPP: 2.9 RATIO
QUALITY CONTROL: YES

## 2021-01-19 PROCEDURE — 99072 ADDL SUPL MATRL&STAF TM PHE: CPT

## 2021-01-19 PROCEDURE — 85610 PROTHROMBIN TIME: CPT | Mod: QW

## 2021-01-19 PROCEDURE — 93793 ANTICOAG MGMT PT WARFARIN: CPT

## 2021-01-25 ENCOUNTER — APPOINTMENT (OUTPATIENT)
Dept: CARDIOLOGY | Facility: CLINIC | Age: 66
End: 2021-01-25
Payer: MEDICARE

## 2021-01-25 ENCOUNTER — NON-APPOINTMENT (OUTPATIENT)
Age: 66
End: 2021-01-25

## 2021-01-25 VITALS
BODY MASS INDEX: 24.25 KG/M2 | DIASTOLIC BLOOD PRESSURE: 97 MMHG | OXYGEN SATURATION: 98 % | HEART RATE: 59 BPM | SYSTOLIC BLOOD PRESSURE: 146 MMHG | WEIGHT: 160 LBS | HEIGHT: 68 IN

## 2021-01-25 DIAGNOSIS — I65.23 OCCLUSION AND STENOSIS OF BILATERAL CAROTID ARTERIES: ICD-10-CM

## 2021-01-25 PROCEDURE — 99072 ADDL SUPL MATRL&STAF TM PHE: CPT

## 2021-01-25 PROCEDURE — 93283 PRGRMG EVAL IMPLANTABLE DFB: CPT

## 2021-01-25 PROCEDURE — 99215 OFFICE O/P EST HI 40 MIN: CPT

## 2021-01-25 PROCEDURE — 93000 ELECTROCARDIOGRAM COMPLETE: CPT | Mod: 59

## 2021-04-08 ENCOUNTER — RX RENEWAL (OUTPATIENT)
Age: 66
End: 2021-04-08

## 2021-04-28 ENCOUNTER — RX RENEWAL (OUTPATIENT)
Age: 66
End: 2021-04-28

## 2021-06-08 ENCOUNTER — RX RENEWAL (OUTPATIENT)
Age: 66
End: 2021-06-08

## 2021-06-09 ENCOUNTER — NON-APPOINTMENT (OUTPATIENT)
Age: 66
End: 2021-06-09

## 2021-06-10 ENCOUNTER — APPOINTMENT (OUTPATIENT)
Dept: CARDIOLOGY | Facility: CLINIC | Age: 66
End: 2021-06-10
Payer: MEDICARE

## 2021-06-10 VITALS — OXYGEN SATURATION: 97 % | HEART RATE: 70 BPM | HEIGHT: 68 IN

## 2021-06-10 LAB
INR PPP: 1.8 RATIO
QUALITY CONTROL: YES

## 2021-06-10 PROCEDURE — 93793 ANTICOAG MGMT PT WARFARIN: CPT

## 2021-06-10 PROCEDURE — 85610 PROTHROMBIN TIME: CPT | Mod: QW

## 2021-06-24 ENCOUNTER — APPOINTMENT (OUTPATIENT)
Dept: CARDIOLOGY | Facility: CLINIC | Age: 66
End: 2021-06-24
Payer: MEDICARE

## 2021-06-24 VITALS — RESPIRATION RATE: 14 BRPM | BODY MASS INDEX: 24.25 KG/M2 | HEIGHT: 68 IN | WEIGHT: 160 LBS

## 2021-06-24 LAB
INR PPP: 2.8 RATIO
QUALITY CONTROL: YES

## 2021-06-24 PROCEDURE — 93793 ANTICOAG MGMT PT WARFARIN: CPT

## 2021-06-24 PROCEDURE — 85610 PROTHROMBIN TIME: CPT | Mod: QW

## 2021-07-29 ENCOUNTER — APPOINTMENT (OUTPATIENT)
Dept: CARDIOLOGY | Facility: CLINIC | Age: 66
End: 2021-07-29
Payer: MEDICARE

## 2021-07-29 VITALS — BODY MASS INDEX: 24.25 KG/M2 | RESPIRATION RATE: 15 BRPM | HEIGHT: 68 IN | WEIGHT: 160 LBS

## 2021-07-29 LAB
INR PPP: 2.3 RATIO
QUALITY CONTROL: YES

## 2021-07-29 PROCEDURE — 93793 ANTICOAG MGMT PT WARFARIN: CPT

## 2021-07-29 PROCEDURE — 85610 PROTHROMBIN TIME: CPT | Mod: QW

## 2021-08-26 ENCOUNTER — APPOINTMENT (OUTPATIENT)
Dept: CARDIOLOGY | Facility: CLINIC | Age: 66
End: 2021-08-26

## 2021-08-31 ENCOUNTER — RX RENEWAL (OUTPATIENT)
Age: 66
End: 2021-08-31

## 2021-10-26 ENCOUNTER — APPOINTMENT (OUTPATIENT)
Dept: CARDIOLOGY | Facility: CLINIC | Age: 66
End: 2021-10-26

## 2021-10-29 ENCOUNTER — NON-APPOINTMENT (OUTPATIENT)
Age: 66
End: 2021-10-29

## 2021-10-29 ENCOUNTER — RX RENEWAL (OUTPATIENT)
Age: 66
End: 2021-10-29

## 2021-11-01 ENCOUNTER — RX RENEWAL (OUTPATIENT)
Age: 66
End: 2021-11-01

## 2021-11-02 ENCOUNTER — APPOINTMENT (OUTPATIENT)
Dept: CARDIOLOGY | Facility: CLINIC | Age: 66
End: 2021-11-02
Payer: MEDICARE

## 2021-11-02 VITALS — HEART RATE: 70 BPM | HEIGHT: 68 IN | OXYGEN SATURATION: 99 %

## 2021-11-02 LAB
INR PPP: 2 RATIO
QUALITY CONTROL: YES

## 2021-11-02 PROCEDURE — 85610 PROTHROMBIN TIME: CPT | Mod: QW

## 2021-11-02 PROCEDURE — 93793 ANTICOAG MGMT PT WARFARIN: CPT

## 2021-11-24 ENCOUNTER — RX RENEWAL (OUTPATIENT)
Age: 66
End: 2021-11-24

## 2021-12-21 ENCOUNTER — APPOINTMENT (OUTPATIENT)
Dept: CARDIOLOGY | Facility: CLINIC | Age: 66
End: 2021-12-21
Payer: MEDICARE

## 2021-12-21 VITALS — OXYGEN SATURATION: 98 % | HEIGHT: 68 IN | HEART RATE: 66 BPM

## 2021-12-21 LAB
INR PPP: 2.2 RATIO
QUALITY CONTROL: YES

## 2021-12-21 PROCEDURE — 93793 ANTICOAG MGMT PT WARFARIN: CPT

## 2021-12-21 PROCEDURE — 85610 PROTHROMBIN TIME: CPT | Mod: QW

## 2022-01-27 ENCOUNTER — RX RENEWAL (OUTPATIENT)
Age: 67
End: 2022-01-27

## 2022-02-15 ENCOUNTER — APPOINTMENT (OUTPATIENT)
Dept: CARDIOLOGY | Facility: CLINIC | Age: 67
End: 2022-02-15
Payer: MEDICARE

## 2022-02-15 LAB
INR PPP: 2.7 RATIO
QUALITY CONTROL: YES

## 2022-02-15 PROCEDURE — 93793 ANTICOAG MGMT PT WARFARIN: CPT

## 2022-02-15 PROCEDURE — 85610 PROTHROMBIN TIME: CPT | Mod: QW

## 2022-02-15 NOTE — PATIENT PROFILE ADULT. - BRADEN SCORE (IF 18 OR LESS ACTIVATE SKIN INJURY RISK INCREASED GUIDELINE), MLM
Elenita Moran MRN# [id number]   YOB: 1956 Age: 66 year old   Date of Admission: 2/8/2022  Requesting physician: Dr. Thompson  Reason for consult: Progression of breast cancer           Assessment and Plan:     Patient is a pleasant 66-year-old female patient of Dr. Varma's with a diagnosis of metastatic breast cancer.  Currently admitted due to issues with congestive heart failure and edema.  Found to have numerous new lesions in her liver.  This is related to progression of her breast cancer.    Patient was seen today briefly.  She did have her liver biopsy done this morning of one of her new liver lesions.  Will hopefully have results back within 1 week to discuss further.  At this time, patient will follow up with one of our physicians Dr. Marsh on Tuesday, 2/22/2022 to review biopsy results and discuss next steps and plan of care.  Plan is to initiate her on new line of treatment with Eribulin 1.4 mg per metered squared given days 1 and 8 every 21 days.  Patient overall is feeling well today and ready to discharge.    1.  Okay to discharge from heme-onc standpoint.  2.  Patient underwent liver biopsy today of one of her new liver lesions found on PET CT scan.  Review biopsy results next week at follow-up visit.  3.  Follow-up with Dr. Marsh on 2/22/2022 for hospital follow-up visit and to initiate on new treatment with Eribulin.  We will work with our scheduling department to confirm this visit.    Disposition: Pending.  Per cardiology and internal medicine.  Heme-onc will sign off, please not hesitate to contact if any further questions or concerns arise.    Wander Friedman PA-C  [facility]  Cell Phone: [phone]             Chief Complaint:   Shortness of Breath           History of Present Illness:   Mrs. Elenita Moran is a 65-year-old woman with metastatic lobular carcinoma of left breast to multiple bone sites she is here for radiation follow-up.    ONCOLOGIC  HISTORY:    1.     The patient had presented with multifocal tumor in the left breast, 7 x 6 x 4 cm, in August 2010.    2.     The patient underwent left breast mastectomy. Her tumor was ER positive 63%, AK positive 17%, and HER-2 negative by FISH.    3.     The patient was treated with Adriamycin plus Cytoxan, followed by weekly Taxol.    4.     In May 2011, the patient had started anastrozole 1 mg orally daily.    5.     The patient was found to have a bony metastasis and the patient underwent radiation from July 2011 until October 2011 for L1, L2, and L3 vertebral bodies.    6.     In January 2015, PET scan showed no evidence of malignancy.    7.  On January 31, 2017 patient has started second line hormonal treatment with Faslodex plus Ibrance.    8.  The patient also underwent CyberKnife to painful L3 vertebral body mass she completed 2400 cGy in 4 fractions on 3/28/2017.  Prior to that she was also treated to a right rib lesion as well as T4, T6-T7 and a posterior left-sided rib lesion.  The latter received 3000 cGy in 10 fractions completing those treatments 3/14/2017..    9. She had disease progression in March 2019 per PET imaging and therapy was changed to everolimus and exemestane on 4/24/19.    10. From 12/6/2019 until 3/30/2020 she was treated with 1st line chemo (refused PIQRAY) and has completed 6 cycles of Abraxane and but discontinue due to neuropathy. She had partial response to treatment.     11. On 3/30/2020 she started 3rd line hormonal treatment: Piqray orally daily plus Faslodex.     12. On 9/24/2020 CT chest abdomen and pelvis Stable appearance of the sacral fractures, left superior and inferior pubic rami fracture, and T10 vertebral body fracture and left 11th rib posteriorly.    13. On 2/9/2021 PET scan demonstrated progressive disease with renewed metabolic activity in a few scattered skeletal metastases. Significant muscular FDG uptake, which decreases the bioavailability of FDG for tumor.  "As a result, the scan likely underestimates the extent of disease activity. Healing fractures in the pelvis and spine with a presumed small hematoma overlying the sacral fractures.    14. On 2021 she started Xeloda orally due to progression.     15. On 2021 PET scan showed mild progression in anterolateral 5th rib and possible liver lesion.     16. On 5/3/2021 she has started Doxil 50 mg/m2 IV every 4 weeks    17. On 2021 CT CAP showed mild fullness in right paraspinal muscles. left obturator ring fracture. complex B/L sacral ala fractures.     18. On 2021 MRI of pelvis showed two complex fluid collections along fracured left superior and inferior pubic rami    19. On 2021 PET/CT showed . While there is persistent FDG avid disease throughout the osseous structures, there is resolution of the right hepatic lobe lesion suggesting partial response to therapy. worsening inflammatory change associated with the pathologically fractured left anterior pubic ramus and right sacrum/iliac bone with development of right gluteal and right paraspinal intramuscular hematomas.    20.  On 2021 PET scan revealed stable bony metastases. No worsening.    21. 22 PET/CT shows persistent widespread osseous disease, with the development of innumerable lesions throughout the liver parenchyma suspicious for progression of disease.     Interval History  Elenita seen at bedside. Underwent liver biopsy today. Felt it went well. No complaints today. Hoping to discharge later today.         Physical Exam:   Vitals were reviewed  Blood pressure 119/78, pulse 115, temperature 98.2  F (36.8  C), temperature source Oral, resp. rate 18, height 1.651 m (5' 5\"), weight 62 kg (136 lb 11 oz), SpO2 95 %.  Temperatures:  Current - Temp: 98.8  F (37.1  C); Max - Temp  Av.8  F (37.1  C)  Min: 98.8  F (37.1  C)  Max: 98.8  F (37.1  C)  Respiration range: Resp  Av.3  Min: 5  Max: 38  Pulse range: Pulse  Av.9  Min: " 96  Max: 129  Blood pressure range: Systolic (24hrs), Av , Min:109 , Max:134   ; Diastolic (24hrs), Av, Min:58, Max:82    Pulse oximetry range: SpO2  Av %  Min: 78 %  Max: 100 %    No formal exam done today.      Intake/Output Summary (Last 24 hours) at 2022 1145  Last data filed at 2022 1050  Gross per 24 hour   Intake 750 ml   Output 5000 ml   Net -4250 ml       RADIOLOGY:   22 CT chest:  IMPRESSION:  1.  No evidence for pulmonary emboli.  2.  Extensive airspace and groundglass opacities throughout both lungs compatible with pneumonia, including Covid 19.  3.  Moderate-sized bilateral pleural effusions and bibasilar atelectasis.  4.  Diffuse skeletal metastasis.     Imaging features can be seen with (COVID-19)  pneumonia, though are nonspecific and can occur with a variety of infectious and noninfectious processes.            19

## 2022-02-24 ENCOUNTER — RX RENEWAL (OUTPATIENT)
Age: 67
End: 2022-02-24

## 2022-03-17 ENCOUNTER — APPOINTMENT (OUTPATIENT)
Dept: CARDIOLOGY | Facility: CLINIC | Age: 67
End: 2022-03-17
Payer: MEDICARE

## 2022-03-17 VITALS — HEIGHT: 68 IN | BODY MASS INDEX: 24.25 KG/M2 | WEIGHT: 160 LBS | RESPIRATION RATE: 15 BRPM

## 2022-03-17 LAB
INR PPP: 2.6 RATIO
QUALITY CONTROL: YES

## 2022-03-17 PROCEDURE — 93793 ANTICOAG MGMT PT WARFARIN: CPT

## 2022-03-17 PROCEDURE — 85610 PROTHROMBIN TIME: CPT | Mod: QW

## 2022-03-20 NOTE — PATIENT PROFILE ADULT. - NUTRITION PROFILE
no indicators present
Pt is stable, not in acute distress. Pt xray reported as no fracture/dislocation. PT to follow up with orthopedics. ACE wrap applied to right knee and crutches given and demonstrated. Anticipatory guidance and strict return precautions given.

## 2022-03-21 ENCOUNTER — APPOINTMENT (OUTPATIENT)
Dept: CARDIOLOGY | Facility: CLINIC | Age: 67
End: 2022-03-21
Payer: MEDICARE

## 2022-03-21 PROCEDURE — 93295 DEV INTERROG REMOTE 1/2/MLT: CPT

## 2022-03-21 PROCEDURE — 93296 REM INTERROG EVL PM/IDS: CPT

## 2022-04-13 ENCOUNTER — APPOINTMENT (OUTPATIENT)
Dept: CARDIOLOGY | Facility: CLINIC | Age: 67
End: 2022-04-13
Payer: MEDICARE

## 2022-04-13 ENCOUNTER — NON-APPOINTMENT (OUTPATIENT)
Age: 67
End: 2022-04-13

## 2022-04-13 VITALS
DIASTOLIC BLOOD PRESSURE: 84 MMHG | WEIGHT: 177 LBS | BODY MASS INDEX: 26.83 KG/M2 | OXYGEN SATURATION: 98 % | HEIGHT: 68 IN | HEART RATE: 76 BPM | SYSTOLIC BLOOD PRESSURE: 137 MMHG

## 2022-04-13 DIAGNOSIS — I34.0 NONRHEUMATIC MITRAL (VALVE) INSUFFICIENCY: ICD-10-CM

## 2022-04-13 DIAGNOSIS — Z87.891 PERSONAL HISTORY OF NICOTINE DEPENDENCE: ICD-10-CM

## 2022-04-13 PROCEDURE — 99215 OFFICE O/P EST HI 40 MIN: CPT

## 2022-04-13 PROCEDURE — 93000 ELECTROCARDIOGRAM COMPLETE: CPT

## 2022-05-26 ENCOUNTER — APPOINTMENT (OUTPATIENT)
Dept: CARDIOLOGY | Facility: CLINIC | Age: 67
End: 2022-05-26
Payer: MEDICARE

## 2022-05-26 VITALS — HEIGHT: 68 IN | OXYGEN SATURATION: 97 % | HEART RATE: 80 BPM

## 2022-05-26 LAB
INR PPP: 3.7 RATIO
QUALITY CONTROL: YES

## 2022-05-26 PROCEDURE — 93793 ANTICOAG MGMT PT WARFARIN: CPT

## 2022-05-26 PROCEDURE — 85610 PROTHROMBIN TIME: CPT | Mod: QW

## 2022-05-31 ENCOUNTER — NON-APPOINTMENT (OUTPATIENT)
Age: 67
End: 2022-05-31

## 2022-06-01 ENCOUNTER — RX RENEWAL (OUTPATIENT)
Age: 67
End: 2022-06-01

## 2022-06-20 ENCOUNTER — APPOINTMENT (OUTPATIENT)
Dept: CARDIOLOGY | Facility: CLINIC | Age: 67
End: 2022-06-20
Payer: MEDICARE

## 2022-06-20 PROCEDURE — 93295 DEV INTERROG REMOTE 1/2/MLT: CPT

## 2022-06-20 PROCEDURE — 93296 REM INTERROG EVL PM/IDS: CPT

## 2022-06-21 ENCOUNTER — RX RENEWAL (OUTPATIENT)
Age: 67
End: 2022-06-21

## 2022-06-23 ENCOUNTER — APPOINTMENT (OUTPATIENT)
Dept: CARDIOLOGY | Facility: CLINIC | Age: 67
End: 2022-06-23
Payer: MEDICARE

## 2022-06-23 VITALS — WEIGHT: 177 LBS | HEIGHT: 68 IN | RESPIRATION RATE: 16 BRPM | BODY MASS INDEX: 26.83 KG/M2

## 2022-06-23 LAB
INR PPP: 2.1 RATIO
QUALITY CONTROL: YES

## 2022-06-23 PROCEDURE — 85610 PROTHROMBIN TIME: CPT | Mod: QW

## 2022-06-23 PROCEDURE — 93793 ANTICOAG MGMT PT WARFARIN: CPT

## 2022-07-14 ENCOUNTER — APPOINTMENT (OUTPATIENT)
Dept: CARDIOLOGY | Facility: CLINIC | Age: 67
End: 2022-07-14

## 2022-07-14 PROCEDURE — 93283 PRGRMG EVAL IMPLANTABLE DFB: CPT

## 2022-09-01 ENCOUNTER — APPOINTMENT (OUTPATIENT)
Dept: CARDIOLOGY | Facility: CLINIC | Age: 67
End: 2022-09-01

## 2022-09-01 VITALS — OXYGEN SATURATION: 97 % | HEART RATE: 78 BPM | HEIGHT: 68 IN

## 2022-09-01 LAB
INR PPP: 1.5 RATIO
QUALITY CONTROL: YES

## 2022-09-01 PROCEDURE — 93793 ANTICOAG MGMT PT WARFARIN: CPT

## 2022-09-01 PROCEDURE — 85610 PROTHROMBIN TIME: CPT | Mod: QW

## 2022-09-08 ENCOUNTER — APPOINTMENT (OUTPATIENT)
Dept: CARDIOLOGY | Facility: CLINIC | Age: 67
End: 2022-09-08

## 2022-09-08 LAB
INR PPP: 2.7 RATIO
QUALITY CONTROL: YES

## 2022-09-08 PROCEDURE — 85610 PROTHROMBIN TIME: CPT | Mod: QW

## 2022-09-08 PROCEDURE — 93793 ANTICOAG MGMT PT WARFARIN: CPT

## 2022-10-13 ENCOUNTER — APPOINTMENT (OUTPATIENT)
Dept: CARDIOLOGY | Facility: CLINIC | Age: 67
End: 2022-10-13

## 2022-10-13 PROCEDURE — 93296 REM INTERROG EVL PM/IDS: CPT

## 2022-10-13 PROCEDURE — 93295 DEV INTERROG REMOTE 1/2/MLT: CPT

## 2022-11-08 ENCOUNTER — APPOINTMENT (OUTPATIENT)
Dept: CARDIOLOGY | Facility: CLINIC | Age: 67
End: 2022-11-08

## 2022-11-08 LAB
INR PPP: 3.4 RATIO
QUALITY CONTROL: YES

## 2022-11-08 PROCEDURE — 85610 PROTHROMBIN TIME: CPT | Mod: QW

## 2022-11-08 PROCEDURE — 93793 ANTICOAG MGMT PT WARFARIN: CPT

## 2022-11-15 ENCOUNTER — APPOINTMENT (OUTPATIENT)
Dept: CARDIOLOGY | Facility: CLINIC | Age: 67
End: 2022-11-15

## 2022-11-17 ENCOUNTER — APPOINTMENT (OUTPATIENT)
Dept: CARDIOLOGY | Facility: CLINIC | Age: 67
End: 2022-11-17

## 2022-11-17 LAB
INR PPP: 2.4 RATIO
QUALITY CONTROL: YES

## 2022-11-17 PROCEDURE — 93793 ANTICOAG MGMT PT WARFARIN: CPT

## 2022-11-17 PROCEDURE — 85610 PROTHROMBIN TIME: CPT | Mod: QW

## 2022-12-13 ENCOUNTER — APPOINTMENT (OUTPATIENT)
Dept: CARDIOLOGY | Facility: CLINIC | Age: 67
End: 2022-12-13

## 2022-12-13 LAB
INR PPP: 2.7 RATIO
QUALITY CONTROL: YES

## 2022-12-13 PROCEDURE — 93793 ANTICOAG MGMT PT WARFARIN: CPT

## 2022-12-13 PROCEDURE — 85610 PROTHROMBIN TIME: CPT | Mod: QW

## 2023-01-10 ENCOUNTER — APPOINTMENT (OUTPATIENT)
Dept: CARDIOLOGY | Facility: CLINIC | Age: 68
End: 2023-01-10
Payer: MEDICARE

## 2023-01-10 LAB
INR PPP: 2.6 RATIO
QUALITY CONTROL: YES

## 2023-01-10 PROCEDURE — 85610 PROTHROMBIN TIME: CPT | Mod: QW

## 2023-01-10 PROCEDURE — 93793 ANTICOAG MGMT PT WARFARIN: CPT

## 2023-01-12 ENCOUNTER — NON-APPOINTMENT (OUTPATIENT)
Age: 68
End: 2023-01-12

## 2023-01-12 ENCOUNTER — APPOINTMENT (OUTPATIENT)
Dept: CARDIOLOGY | Facility: CLINIC | Age: 68
End: 2023-01-12
Payer: MEDICARE

## 2023-01-12 PROCEDURE — 93295 DEV INTERROG REMOTE 1/2/MLT: CPT

## 2023-01-12 PROCEDURE — 93296 REM INTERROG EVL PM/IDS: CPT

## 2023-02-07 ENCOUNTER — APPOINTMENT (OUTPATIENT)
Dept: CARDIOLOGY | Facility: CLINIC | Age: 68
End: 2023-02-07
Payer: MEDICARE

## 2023-02-07 LAB
INR PPP: 2.8 RATIO
QUALITY CONTROL: YES

## 2023-02-07 PROCEDURE — 85610 PROTHROMBIN TIME: CPT | Mod: QW

## 2023-02-07 PROCEDURE — 93793 ANTICOAG MGMT PT WARFARIN: CPT

## 2023-02-16 ENCOUNTER — RX RENEWAL (OUTPATIENT)
Age: 68
End: 2023-02-16

## 2023-03-07 ENCOUNTER — APPOINTMENT (OUTPATIENT)
Dept: CARDIOLOGY | Facility: CLINIC | Age: 68
End: 2023-03-07
Payer: MEDICARE

## 2023-03-07 LAB
INR PPP: 3 RATIO
QUALITY CONTROL: YES

## 2023-03-07 PROCEDURE — 85610 PROTHROMBIN TIME: CPT | Mod: QW

## 2023-03-07 PROCEDURE — 93793 ANTICOAG MGMT PT WARFARIN: CPT

## 2023-03-23 ENCOUNTER — RX RENEWAL (OUTPATIENT)
Age: 68
End: 2023-03-23

## 2023-04-04 ENCOUNTER — APPOINTMENT (OUTPATIENT)
Dept: CARDIOLOGY | Facility: CLINIC | Age: 68
End: 2023-04-04
Payer: MEDICARE

## 2023-04-04 LAB
INR PPP: 2.4 RATIO
QUALITY CONTROL: YES

## 2023-04-04 PROCEDURE — 93793 ANTICOAG MGMT PT WARFARIN: CPT

## 2023-04-04 PROCEDURE — 85610 PROTHROMBIN TIME: CPT | Mod: QW

## 2023-04-12 ENCOUNTER — APPOINTMENT (OUTPATIENT)
Dept: CARDIOLOGY | Facility: CLINIC | Age: 68
End: 2023-04-12
Payer: MEDICARE

## 2023-04-12 PROCEDURE — 93306 TTE W/DOPPLER COMPLETE: CPT

## 2023-04-13 ENCOUNTER — APPOINTMENT (OUTPATIENT)
Dept: CARDIOLOGY | Facility: CLINIC | Age: 68
End: 2023-04-13
Payer: MEDICARE

## 2023-04-13 PROCEDURE — 93296 REM INTERROG EVL PM/IDS: CPT

## 2023-04-13 PROCEDURE — 93880 EXTRACRANIAL BILAT STUDY: CPT

## 2023-04-13 PROCEDURE — 93295 DEV INTERROG REMOTE 1/2/MLT: CPT

## 2023-04-16 NOTE — H&P CARDIOLOGY - STREET DRUG/MEDICATION/INHALANT, DURATION OF USE, PROFILE
Patient arrival to  from CVICU in wheelchair accompanied by RN. Patient oriented to room and call light and safety. Skin assessed with eunice KELLER.   50

## 2023-05-31 ENCOUNTER — APPOINTMENT (OUTPATIENT)
Dept: CARDIOLOGY | Facility: CLINIC | Age: 68
End: 2023-05-31
Payer: MEDICARE

## 2023-05-31 ENCOUNTER — NON-APPOINTMENT (OUTPATIENT)
Age: 68
End: 2023-05-31

## 2023-05-31 VITALS
HEART RATE: 75 BPM | DIASTOLIC BLOOD PRESSURE: 98 MMHG | BODY MASS INDEX: 26.52 KG/M2 | HEIGHT: 68 IN | SYSTOLIC BLOOD PRESSURE: 148 MMHG | WEIGHT: 175 LBS | OXYGEN SATURATION: 97 %

## 2023-05-31 DIAGNOSIS — E78.5 HYPERLIPIDEMIA, UNSPECIFIED: ICD-10-CM

## 2023-05-31 DIAGNOSIS — I25.5 ISCHEMIC CARDIOMYOPATHY: ICD-10-CM

## 2023-05-31 DIAGNOSIS — R06.09 OTHER FORMS OF DYSPNEA: ICD-10-CM

## 2023-05-31 DIAGNOSIS — I25.10 ATHEROSCLEROTIC HEART DISEASE OF NATIVE CORONARY ARTERY W/OUT ANGINA PECTORIS: ICD-10-CM

## 2023-05-31 PROCEDURE — 93000 ELECTROCARDIOGRAM COMPLETE: CPT

## 2023-05-31 PROCEDURE — 99215 OFFICE O/P EST HI 40 MIN: CPT

## 2023-05-31 RX ORDER — RAMIPRIL 2.5 MG/1
2.5 CAPSULE ORAL
Qty: 90 | Refills: 3 | Status: ACTIVE | COMMUNITY
Start: 2023-05-31

## 2023-05-31 RX ORDER — SACUBITRIL AND VALSARTAN 24; 26 MG/1; MG/1
24-26 TABLET, FILM COATED ORAL TWICE DAILY
Qty: 60 | Refills: 5 | Status: DISCONTINUED | COMMUNITY
Start: 2023-05-31 | End: 2023-05-31

## 2023-06-01 RX ORDER — SACUBITRIL AND VALSARTAN 24; 26 MG/1; MG/1
24-26 TABLET, FILM COATED ORAL
Qty: 180 | Refills: 2 | Status: ACTIVE | COMMUNITY
Start: 2023-06-01

## 2023-06-09 ENCOUNTER — RX RENEWAL (OUTPATIENT)
Age: 68
End: 2023-06-09

## 2023-06-24 ENCOUNTER — RX RENEWAL (OUTPATIENT)
Age: 68
End: 2023-06-24

## 2023-06-26 ENCOUNTER — RX RENEWAL (OUTPATIENT)
Age: 68
End: 2023-06-26

## 2023-07-13 ENCOUNTER — NON-APPOINTMENT (OUTPATIENT)
Age: 68
End: 2023-07-13

## 2023-07-13 ENCOUNTER — APPOINTMENT (OUTPATIENT)
Dept: CARDIOLOGY | Facility: CLINIC | Age: 68
End: 2023-07-13
Payer: MEDICARE

## 2023-07-13 PROCEDURE — 93296 REM INTERROG EVL PM/IDS: CPT

## 2023-07-13 PROCEDURE — 93295 DEV INTERROG REMOTE 1/2/MLT: CPT

## 2023-07-18 ENCOUNTER — RX RENEWAL (OUTPATIENT)
Age: 68
End: 2023-07-18

## 2023-08-01 ENCOUNTER — APPOINTMENT (OUTPATIENT)
Dept: CARDIOLOGY | Facility: CLINIC | Age: 68
End: 2023-08-01
Payer: MEDICARE

## 2023-08-01 VITALS — HEIGHT: 68 IN | WEIGHT: 175 LBS | RESPIRATION RATE: 17 BRPM | BODY MASS INDEX: 26.52 KG/M2

## 2023-08-01 LAB
INR PPP: 3.3 RATIO
QUALITY CONTROL: YES

## 2023-08-01 PROCEDURE — 93793 ANTICOAG MGMT PT WARFARIN: CPT

## 2023-08-01 PROCEDURE — 85610 PROTHROMBIN TIME: CPT | Mod: QW

## 2023-08-10 ENCOUNTER — APPOINTMENT (OUTPATIENT)
Dept: CARDIOLOGY | Facility: CLINIC | Age: 68
End: 2023-08-10
Payer: MEDICARE

## 2023-08-10 PROCEDURE — 93283 PRGRMG EVAL IMPLANTABLE DFB: CPT

## 2023-08-22 ENCOUNTER — APPOINTMENT (OUTPATIENT)
Dept: CARDIOLOGY | Facility: CLINIC | Age: 68
End: 2023-08-22
Payer: MEDICARE

## 2023-08-22 LAB
INR PPP: 2 RATIO
QUALITY CONTROL: YES

## 2023-08-22 PROCEDURE — 85610 PROTHROMBIN TIME: CPT | Mod: QW

## 2023-08-22 PROCEDURE — 93793 ANTICOAG MGMT PT WARFARIN: CPT

## 2023-09-12 ENCOUNTER — APPOINTMENT (OUTPATIENT)
Dept: CARDIOLOGY | Facility: CLINIC | Age: 68
End: 2023-09-12
Payer: MEDICARE

## 2023-09-12 LAB
INR PPP: 2.6 RATIO
QUALITY CONTROL: YES

## 2023-09-12 PROCEDURE — 93793 ANTICOAG MGMT PT WARFARIN: CPT

## 2023-09-12 PROCEDURE — 85610 PROTHROMBIN TIME: CPT | Mod: QW

## 2023-09-20 ENCOUNTER — RX RENEWAL (OUTPATIENT)
Age: 68
End: 2023-09-20

## 2023-10-10 ENCOUNTER — APPOINTMENT (OUTPATIENT)
Dept: CARDIOLOGY | Facility: CLINIC | Age: 68
End: 2023-10-10
Payer: MEDICARE

## 2023-10-10 VITALS — HEIGHT: 68 IN | RESPIRATION RATE: 17 BRPM | WEIGHT: 175 LBS | BODY MASS INDEX: 26.52 KG/M2

## 2023-10-10 LAB
INR PPP: 2.8 RATIO
QUALITY CONTROL: YES

## 2023-10-10 PROCEDURE — 93793 ANTICOAG MGMT PT WARFARIN: CPT | Mod: QW

## 2023-10-10 PROCEDURE — 85610 PROTHROMBIN TIME: CPT | Mod: QW

## 2023-10-12 ENCOUNTER — NON-APPOINTMENT (OUTPATIENT)
Age: 68
End: 2023-10-12

## 2023-10-12 ENCOUNTER — APPOINTMENT (OUTPATIENT)
Dept: CARDIOLOGY | Facility: CLINIC | Age: 68
End: 2023-10-12
Payer: MEDICARE

## 2023-10-12 PROCEDURE — 93295 DEV INTERROG REMOTE 1/2/MLT: CPT

## 2023-10-12 PROCEDURE — 93296 REM INTERROG EVL PM/IDS: CPT

## 2023-11-07 ENCOUNTER — APPOINTMENT (OUTPATIENT)
Dept: CARDIOLOGY | Facility: CLINIC | Age: 68
End: 2023-11-07
Payer: MEDICARE

## 2023-11-07 LAB
INR PPP: 3.1 RATIO
QUALITY CONTROL: YES

## 2023-11-07 PROCEDURE — 93793 ANTICOAG MGMT PT WARFARIN: CPT

## 2023-11-07 PROCEDURE — 85610 PROTHROMBIN TIME: CPT | Mod: QW

## 2023-11-28 ENCOUNTER — APPOINTMENT (OUTPATIENT)
Dept: CARDIOLOGY | Facility: CLINIC | Age: 68
End: 2023-11-28
Payer: MEDICARE

## 2023-11-28 LAB
INR PPP: 2.2 RATIO
QUALITY CONTROL: YES

## 2023-11-28 PROCEDURE — 93793 ANTICOAG MGMT PT WARFARIN: CPT

## 2023-11-28 PROCEDURE — 85610 PROTHROMBIN TIME: CPT | Mod: QW

## 2024-01-02 ENCOUNTER — APPOINTMENT (OUTPATIENT)
Dept: CARDIOLOGY | Facility: CLINIC | Age: 69
End: 2024-01-02
Payer: MEDICARE

## 2024-01-02 LAB
INR PPP: 2.7 RATIO
QUALITY CONTROL: YES

## 2024-01-02 PROCEDURE — 85610 PROTHROMBIN TIME: CPT | Mod: QW

## 2024-01-02 PROCEDURE — 93793 ANTICOAG MGMT PT WARFARIN: CPT

## 2024-01-10 ENCOUNTER — RX RENEWAL (OUTPATIENT)
Age: 69
End: 2024-01-10

## 2024-01-10 RX ORDER — WARFARIN 3 MG/1
3 TABLET ORAL
Qty: 180 | Refills: 3 | Status: ACTIVE | COMMUNITY
Start: 2018-03-06 | End: 1900-01-01

## 2024-01-11 ENCOUNTER — APPOINTMENT (OUTPATIENT)
Dept: CARDIOLOGY | Facility: CLINIC | Age: 69
End: 2024-01-11
Payer: MEDICARE

## 2024-01-11 ENCOUNTER — NON-APPOINTMENT (OUTPATIENT)
Age: 69
End: 2024-01-11

## 2024-01-11 PROCEDURE — 93296 REM INTERROG EVL PM/IDS: CPT

## 2024-01-11 PROCEDURE — 93295 DEV INTERROG REMOTE 1/2/MLT: CPT

## 2024-01-30 ENCOUNTER — APPOINTMENT (OUTPATIENT)
Dept: CARDIOLOGY | Facility: CLINIC | Age: 69
End: 2024-01-30
Payer: MEDICARE

## 2024-01-30 LAB
INR PPP: 2 RATIO
QUALITY CONTROL: YES

## 2024-01-30 PROCEDURE — 93793 ANTICOAG MGMT PT WARFARIN: CPT

## 2024-01-30 PROCEDURE — 85610 PROTHROMBIN TIME: CPT | Mod: QW

## 2024-02-29 ENCOUNTER — APPOINTMENT (OUTPATIENT)
Dept: CARDIOLOGY | Facility: CLINIC | Age: 69
End: 2024-02-29
Payer: MEDICARE

## 2024-02-29 LAB
INR PPP: 3 RATIO
QUALITY CONTROL: YES

## 2024-02-29 PROCEDURE — 85610 PROTHROMBIN TIME: CPT | Mod: QW

## 2024-02-29 PROCEDURE — 93793 ANTICOAG MGMT PT WARFARIN: CPT

## 2024-03-28 ENCOUNTER — APPOINTMENT (OUTPATIENT)
Dept: CARDIOLOGY | Facility: CLINIC | Age: 69
End: 2024-03-28
Payer: MEDICARE

## 2024-03-28 PROCEDURE — 93793 ANTICOAG MGMT PT WARFARIN: CPT

## 2024-03-28 PROCEDURE — 85610 PROTHROMBIN TIME: CPT | Mod: QW

## 2024-03-29 LAB
INR PPP: 1.6 RATIO
QUALITY CONTROL: YES

## 2024-04-10 ENCOUNTER — NON-APPOINTMENT (OUTPATIENT)
Age: 69
End: 2024-04-10

## 2024-04-11 ENCOUNTER — APPOINTMENT (OUTPATIENT)
Dept: CARDIOLOGY | Facility: CLINIC | Age: 69
End: 2024-04-11
Payer: MEDICARE

## 2024-04-11 PROCEDURE — 93793 ANTICOAG MGMT PT WARFARIN: CPT

## 2024-04-11 PROCEDURE — 85610 PROTHROMBIN TIME: CPT | Mod: QW

## 2024-04-11 PROCEDURE — 93295 DEV INTERROG REMOTE 1/2/MLT: CPT

## 2024-04-11 PROCEDURE — 93296 REM INTERROG EVL PM/IDS: CPT

## 2024-04-12 LAB
INR PPP: 3.4 RATIO
QUALITY CONTROL: YES

## 2024-04-18 ENCOUNTER — APPOINTMENT (OUTPATIENT)
Dept: CARDIOLOGY | Facility: CLINIC | Age: 69
End: 2024-04-18
Payer: MEDICARE

## 2024-04-18 LAB
INR PPP: 2.9 RATIO
QUALITY CONTROL: YES

## 2024-04-18 PROCEDURE — 93793 ANTICOAG MGMT PT WARFARIN: CPT

## 2024-04-18 PROCEDURE — 85610 PROTHROMBIN TIME: CPT | Mod: QW

## 2024-05-09 ENCOUNTER — APPOINTMENT (OUTPATIENT)
Dept: CARDIOLOGY | Facility: CLINIC | Age: 69
End: 2024-05-09
Payer: MEDICARE

## 2024-05-09 LAB
INR PPP: 3.9 RATIO
QUALITY CONTROL: YES

## 2024-05-09 PROCEDURE — 85610 PROTHROMBIN TIME: CPT | Mod: QW

## 2024-05-09 PROCEDURE — 93793 ANTICOAG MGMT PT WARFARIN: CPT

## 2024-05-16 ENCOUNTER — APPOINTMENT (OUTPATIENT)
Dept: CARDIOLOGY | Facility: CLINIC | Age: 69
End: 2024-05-16
Payer: MEDICARE

## 2024-05-16 PROCEDURE — 85610 PROTHROMBIN TIME: CPT | Mod: QW

## 2024-05-16 PROCEDURE — 93793 ANTICOAG MGMT PT WARFARIN: CPT

## 2024-05-19 ENCOUNTER — RX RENEWAL (OUTPATIENT)
Age: 69
End: 2024-05-19

## 2024-05-20 ENCOUNTER — NON-APPOINTMENT (OUTPATIENT)
Age: 69
End: 2024-05-20

## 2024-05-20 ENCOUNTER — APPOINTMENT (OUTPATIENT)
Dept: CARDIOLOGY | Facility: CLINIC | Age: 69
End: 2024-05-20
Payer: MEDICARE

## 2024-05-20 LAB
INR PPP: 2.9 RATIO
QUALITY CONTROL: YES

## 2024-05-20 PROCEDURE — 93283 PRGRMG EVAL IMPLANTABLE DFB: CPT

## 2024-05-20 PROCEDURE — 93000 ELECTROCARDIOGRAM COMPLETE: CPT | Mod: 59

## 2024-06-06 ENCOUNTER — APPOINTMENT (OUTPATIENT)
Dept: CARDIOLOGY | Facility: CLINIC | Age: 69
End: 2024-06-06
Payer: MEDICARE

## 2024-06-06 LAB
INR PPP: 2.9 RATIO
QUALITY CONTROL: YES

## 2024-06-06 PROCEDURE — 85610 PROTHROMBIN TIME: CPT | Mod: QW

## 2024-06-06 PROCEDURE — 93793 ANTICOAG MGMT PT WARFARIN: CPT

## 2024-06-27 ENCOUNTER — NON-APPOINTMENT (OUTPATIENT)
Age: 69
End: 2024-06-27

## 2024-07-02 ENCOUNTER — APPOINTMENT (OUTPATIENT)
Dept: CARDIOLOGY | Facility: CLINIC | Age: 69
End: 2024-07-02
Payer: MEDICARE

## 2024-07-02 LAB
INR PPP: 2.3 RATIO
QUALITY CONTROL: YES

## 2024-07-02 PROCEDURE — 85610 PROTHROMBIN TIME: CPT | Mod: QW

## 2024-07-02 PROCEDURE — 93793 ANTICOAG MGMT PT WARFARIN: CPT

## 2024-07-08 ENCOUNTER — RX RENEWAL (OUTPATIENT)
Age: 69
End: 2024-07-08

## 2024-07-08 ENCOUNTER — APPOINTMENT (OUTPATIENT)
Dept: CARDIOLOGY | Facility: CLINIC | Age: 69
End: 2024-07-08
Payer: MEDICARE

## 2024-07-08 PROCEDURE — 93306 TTE W/DOPPLER COMPLETE: CPT

## 2024-07-31 ENCOUNTER — APPOINTMENT (OUTPATIENT)
Dept: CARDIOLOGY | Facility: CLINIC | Age: 69
End: 2024-07-31
Payer: MEDICARE

## 2024-07-31 VITALS
HEART RATE: 72 BPM | WEIGHT: 196 LBS | HEIGHT: 68 IN | DIASTOLIC BLOOD PRESSURE: 86 MMHG | BODY MASS INDEX: 29.7 KG/M2 | OXYGEN SATURATION: 96 % | SYSTOLIC BLOOD PRESSURE: 148 MMHG

## 2024-07-31 DIAGNOSIS — I47.20 VENTRICULAR TACHYCARDIA, UNSPECIFIED: ICD-10-CM

## 2024-07-31 DIAGNOSIS — R06.09 OTHER FORMS OF DYSPNEA: ICD-10-CM

## 2024-07-31 DIAGNOSIS — I25.5 ISCHEMIC CARDIOMYOPATHY: ICD-10-CM

## 2024-07-31 DIAGNOSIS — I25.10 ATHEROSCLEROTIC HEART DISEASE OF NATIVE CORONARY ARTERY W/OUT ANGINA PECTORIS: ICD-10-CM

## 2024-07-31 PROCEDURE — 99215 OFFICE O/P EST HI 40 MIN: CPT

## 2024-07-31 PROCEDURE — G2211 COMPLEX E/M VISIT ADD ON: CPT

## 2024-07-31 PROCEDURE — 93000 ELECTROCARDIOGRAM COMPLETE: CPT

## 2024-07-31 NOTE — DISCUSSION/SUMMARY
[EKG obtained to assist in diagnosis and management of assessed problem(s)] : EKG obtained to assist in diagnosis and management of assessed problem(s) [FreeTextEntry1] : Mr. Mckeon has a history of an ischemic cardiomyopathy, non-sustained ventricular tachycardia, and TIA's.  Over the past few months, he has been experiencing a sensation as if he cannot take in a full breath, occasionally wheezing in the morning, and feeling as if his voice is not as strong.  He does not specifically describe experiencing dyspnea on exertion or orthopnea.  He has not noted lower extremity edema.  His cardiac examination today is unremarkable, and specifically, he is not exhibiting evidence of congestive heart failure on examination. His blood pressure reading is satisfactory today. His electrocardiogram today reveals sinus rhythm at a rate of 71 beats per minute, an anterior infarction of undetermined age, an intraventricular conduction delay, nonspecific diminished voltage in leads V4-V6, and non-specific ST-T wave abnormalities, essentially unchanged from his previous office tracing.  In view of the patient having severe global left ventricular systolic dysfunction, I had tried switching him from ramipril to Entresto, however, his insurance did not cover the Entresto.  I am referring the patient for blood testing, including a proBNP level, chemistry screen, lipid profile, hemoglobin A1c level, and CBC.  If his proBNP level is significantly elevated, consideration will be given toward a trial of diuretic therapy.  I have instructed him to continue Lipitor 20 mg daily in the interim.  A fingerstick INR measurement performed on 7/2/2024 revealed the reading to be 2.3.  The patient was instructed at that time to take Coumadin 6 mg 6 days/week and 3 mg 1 day/week, and to have a follow-up INR measurement performed in 4 weeks for reassessment.    Arrangements will be made for the patient to have a follow-up in-office interrogation of his ICD/pacemaker device.  I have reviewed the findings of the pharmacological nuclear stress study of 10/7/19, the echocardiogram of 7/8/2024, and the carotid artery Doppler study of 4/12/2023 in detail with the patient today.  I am referring the patient for follow-up carotid artery Doppler testing at this point to reassess the degree of atherosclerosis formation.  He is going to make arrangements to have this study performed through our office, and I will telephone him to discuss the findings, once the study has been completed.  The importance of proper dietary habits, properly maintenance, and regular exercise was again emphasized to the patient today.  In view of the patient's symptom of dyspnea and occasional wheezing, as well as his previous longstanding cigarette smoking history, I have referred him for consultation with a pulmonologist for assessment of his pulmonary function.  I have asked the patient to call me if he should have any questions or problems pertaining to these matters, and especially if he should experience any concerning symptoms.  I have otherwise asked him to return to the office for follow-up cardiac evaluation and blood pressure reassessment in 2-3 months, provided he remains clinically stable in the interim.

## 2024-07-31 NOTE — CARDIOLOGY SUMMARY
[de-identified] : 7/31/24 -sinus rhythm at a rate of 71 bpm.  Intraventricular conduction delay.  Anterior infarct, age undetermined.  Nonspecific diminished voltage in leads V4-V6.  Nonspecific ST-T wave abnormalities.

## 2024-07-31 NOTE — PHYSICAL EXAM
[General Appearance - Well Developed] : well developed [General Appearance - In No Acute Distress] : no acute distress [Normal Conjunctiva] : the conjunctiva exhibited no abnormalities [No Oral Pallor] : no oral pallor [Respiration, Rhythm And Depth] : normal respiratory rhythm and effort [Exaggerated Use Of Accessory Muscles For Inspiration] : no accessory muscle use [Auscultation Breath Sounds / Voice Sounds] : lungs were clear to auscultation bilaterally [Bowel Sounds] : normal bowel sounds [Abdomen Tenderness] : non-tender [Abnormal Walk] : normal gait [Cyanosis, Localized] : no localized cyanosis [] : no rash [Oriented To Time, Place, And Person] : oriented to person, place, and time [Not Palpable] : not palpable [No Precordial Heave] : no precordial heave was noted [Normal Rate] : normal [Rhythm Regular] : regular [Normal S1] : normal S1 [Normal S2] : normal S2 [No Gallop] : no gallop heard [No Murmur] : no murmurs heard [2+] : left 2+ [No Abnormalities] : the abdominal aorta was not enlarged and no bruit was heard [No Pitting Edema] : no pitting edema present [FreeTextEntry1] : No JVD is appreciated at a 45 angle. [Apical Thrill] : no thrill palpable at the apex [Click] : no click [Pericardial Rub] : no pericardial rub [Right Carotid Bruit] : no bruit heard over the right carotid [Left Carotid Bruit] : no bruit heard over the left carotid

## 2024-07-31 NOTE — HISTORY OF PRESENT ILLNESS
[FreeTextEntry1] : Mr. Osvaldo Mckeon presented to the office today for follow-up cardiac evaluation. I last evaluated the patient in the office on 5/31/2023.  The patient is a 69-year-old male with a history of an ischemic cardiomyopathy, non-sustained ventricular tachycardia, and implantable cardiac defibrillator/pacemaker (initially implanted 6/11/08, most recent generator change 10/31/17), transient ischemic attacks (last having occurred on  4/5/08), left ventricular apical mural thrombus formation (detected on cardiac catheterization on 4/29/08, at which point anticoagulation was initiated), mild mitral regurgitation, mild to moderate tricuspid regurgitation, mild carotid atherosclerosis, and a dyslipidemia.  The patient telephoned me on 7/2/2024, reporting having been experiencing dyspnea over the preceding few months, which she attributed to having gained approximately 30 pounds through dietary indiscretion.  I referred him for follow-up echocardiography, which was performed on 7/8/2024, revealing severe left ventricular systolic dysfunction (EF 26%), mild left ventricular diastolic dysfunction, mild mitral regurgitation, and mild to moderate tricuspid regurgitation (normal PASP), essentially unchanged from his previous study.  The patient has continued to experience dyspnea, however, on further questioning, he does not specifically describe experiencing dyspnea on exertion or orthopnea.  Rather, he feels as if he is unable to take a full breath and that his voice is not as strong.  He is most apt to notice the sensation when first arising from bed in the morning, at which time he has occasionally noted wheezing.  He does not report having experienced chest discomfort in association with his activities.  He has not noted lower extremity swelling.  He has rarely experienced brief palpitations, described as a sensation of his heart speeding up, at times associated with mild lightheadedness.  He has not experienced any episodes of presyncope or syncope.  He has not received any recognized shocks from his ICD device.  He has not experienced recurrence of expressive aphasia or focal neurological deficits.  The patient most recently had his [employer] ICD/pacemaker device interrogated in our office on 7/14/2022.  A remote transmission was performed on 4/13/2023, revealing the device to be functioning properly in the DDD mode with a lower rate limit of 60 bpm.  Satisfactory sensing and pacing thresholds were exhibited.  Ventricular pacing had occurred <0.1% of the time (AS-VS 86.6%, AP-VS 13.4% since 1/12/2023).  The estimated battery life at that time was 5.8 years.  A fingerstick INR measurement performed on 7/2/2024 revealed the reading to be 2.3.  The patient was instructed at that time to continue Coumadin 6 mg 6 days per week and 3 mg 1 day/week, and to have a follow-up INR measurement performed in 4 weeks for reassessment.  As far as risk factors for coronary artery disease are concerned, the patient has a history of a dyslipidemia.  He does not have a history of hypertension or diabetes.  He initially discontinued cigarette smoking in April 2008 after having previously smoked an average of 1 pack/day for 40 years, and then subsequently resumed smoking an average of half a pack per day until quitting entirely in June 2021 he does not have a known immediate family history of premature coronary artery disease.  Coronary angiography performed on 5/13/08 revealed the left main coronary artery to be normal. The left anterior descending artery was occluded in the mid-portion. There was a 90% stenosis involving the second obtuse marginal branch of the circumflex artery. There was proximal occlusion of the right coronary artery. Left ventriculography revealed severe diffuse left ventricular hypokinesis with evidence of an apical mural thrombus. The patient was started on anticoagulation (Coumadin) at that time, in view of the finding of a left ventricular apical thrombus, with a history of a recent TIA.  A thallium myocardial viability study performed on 5/14/08 revealed extensive regions of infarction, without evidence of viability. Therefore, coronary revascularization was not pursued.  Pharmacological nuclear stress testing most recently performed on 10/7/19 was negative for the inducement of cardiac symptoms, electrocardiographic evidence of myocardial ischemia, or significant arrhythmias. The cardiac imaging portion of the study revealed infarction involving the apical, inferior, inferoseptal, and anterior regions, with an estimated ejection fraction of 22%.  Echocardiography most recently performed in our office on 7/8/2024 revealed the left ventricle to be mildly dilated.  The remainder of the cardiac chambers were normal in dimension.  Left ventricular wall thickness was normal.  Global left ventricular hypokinesis was noted, with a calculated ejection fraction of 26%.  Mild left ventricular diastolic dysfunction was demonstrated, with a normal filling pressure.  Mild mitral regurgitation and mild to moderate tricuspid regurgitation were demonstrated, without evidence of pulmonary hypertension.  Carotid artery Doppler testing most recently performed on 4/13/2023 revealed mild plaque formation involving the common carotid arteries and the bulbar regions bilaterally. Mild to moderate plaque formation was noted involving the proximal portions of the internal carotid arteries bilaterally. No significant stenoses were demonstrated.  These findings represented no significant change when compared with a previous study performed on 9/24/2019.  A Holter monitor study performed from 5/28/08 through 5/29/08 revealed an episode of non-sustained ventricular tachycardia.  Previous History:  I initially evaluated the patient in cardiology consultation on 4/10/08, noting that he had presented to the office for further evaluation of a recent TIA. Specifically, on 4/5/08, he developed expressive aphasia and right-sided weakness, which persisted for approximately 30 minutes (in retrospect, he had experienced an episode of expressive aphasia 2-3 years earlier, which had persisted for approximately one hour). He was admitted to [facility] on 4/5/08, at which time he was diagnosed as having had a TIA. His neurological work-up was reportedly unrevealing. His evaluation at that time but is unrevealing for any evidence of an acute coronary syndrome or significant arrhythmia. His electrocardiogram, however, was suggestive of an anterior infarction of undetermined age.

## 2024-08-06 ENCOUNTER — APPOINTMENT (OUTPATIENT)
Dept: CARDIOLOGY | Facility: CLINIC | Age: 69
End: 2024-08-06

## 2024-08-06 PROCEDURE — 85610 PROTHROMBIN TIME: CPT | Mod: QW

## 2024-08-06 PROCEDURE — 93793 ANTICOAG MGMT PT WARFARIN: CPT

## 2024-08-19 ENCOUNTER — APPOINTMENT (OUTPATIENT)
Dept: CARDIOLOGY | Facility: CLINIC | Age: 69
End: 2024-08-19

## 2024-08-19 PROCEDURE — 93298 REM INTERROG DEV EVAL SCRMS: CPT

## 2024-09-03 ENCOUNTER — APPOINTMENT (OUTPATIENT)
Dept: CARDIOLOGY | Facility: CLINIC | Age: 69
End: 2024-09-03
Payer: MEDICARE

## 2024-09-03 LAB
INR PPP: 2.4 RATIO
QUALITY CONTROL: YES

## 2024-09-03 PROCEDURE — 93793 ANTICOAG MGMT PT WARFARIN: CPT

## 2024-09-03 PROCEDURE — 85610 PROTHROMBIN TIME: CPT | Mod: QW

## 2024-10-08 ENCOUNTER — APPOINTMENT (OUTPATIENT)
Dept: CARDIOLOGY | Facility: CLINIC | Age: 69
End: 2024-10-08
Payer: MEDICARE

## 2024-10-08 VITALS — RESPIRATION RATE: 14 BRPM | BODY MASS INDEX: 29.7 KG/M2 | HEIGHT: 68 IN | WEIGHT: 196 LBS

## 2024-10-08 LAB
INR PPP: 2.4 RATIO
QUALITY CONTROL: YES

## 2024-10-08 PROCEDURE — 85610 PROTHROMBIN TIME: CPT | Mod: QW

## 2024-10-14 ENCOUNTER — RX RENEWAL (OUTPATIENT)
Age: 69
End: 2024-10-14

## 2024-10-17 ENCOUNTER — APPOINTMENT (OUTPATIENT)
Dept: CARDIOLOGY | Facility: CLINIC | Age: 69
End: 2024-10-17
Payer: MEDICARE

## 2024-10-17 PROCEDURE — 93880 EXTRACRANIAL BILAT STUDY: CPT

## 2024-11-05 ENCOUNTER — APPOINTMENT (OUTPATIENT)
Dept: CARDIOLOGY | Facility: CLINIC | Age: 69
End: 2024-11-05
Payer: MEDICARE

## 2024-11-05 VITALS — BODY MASS INDEX: 29.7 KG/M2 | WEIGHT: 196 LBS | RESPIRATION RATE: 14 BRPM | HEIGHT: 68 IN

## 2024-11-05 LAB
INR PPP: 2.5 RATIO
QUALITY CONTROL: YES

## 2024-11-05 PROCEDURE — 93793 ANTICOAG MGMT PT WARFARIN: CPT

## 2024-11-05 PROCEDURE — 85610 PROTHROMBIN TIME: CPT | Mod: QW

## 2024-11-15 ENCOUNTER — RX RENEWAL (OUTPATIENT)
Age: 69
End: 2024-11-15

## 2024-11-18 ENCOUNTER — APPOINTMENT (OUTPATIENT)
Dept: CARDIOLOGY | Facility: CLINIC | Age: 69
End: 2024-11-18

## 2024-11-18 PROCEDURE — 93296 REM INTERROG EVL PM/IDS: CPT

## 2024-11-18 PROCEDURE — 93295 DEV INTERROG REMOTE 1/2/MLT: CPT

## 2024-11-22 ENCOUNTER — APPOINTMENT (OUTPATIENT)
Dept: PULMONOLOGY | Facility: CLINIC | Age: 69
End: 2024-11-22
Payer: MEDICARE

## 2024-11-22 VITALS
SYSTOLIC BLOOD PRESSURE: 148 MMHG | DIASTOLIC BLOOD PRESSURE: 98 MMHG | BODY MASS INDEX: 29.7 KG/M2 | HEART RATE: 83 BPM | HEIGHT: 68 IN | WEIGHT: 196 LBS | OXYGEN SATURATION: 96 %

## 2024-11-22 DIAGNOSIS — R06.09 OTHER FORMS OF DYSPNEA: ICD-10-CM

## 2024-11-22 DIAGNOSIS — J44.9 CHRONIC OBSTRUCTIVE PULMONARY DISEASE, UNSPECIFIED: ICD-10-CM

## 2024-11-22 PROCEDURE — ZZZZZ: CPT

## 2024-11-22 PROCEDURE — 99204 OFFICE O/P NEW MOD 45 MIN: CPT | Mod: 25

## 2024-11-22 PROCEDURE — 94729 DIFFUSING CAPACITY: CPT

## 2024-11-22 PROCEDURE — 94010 BREATHING CAPACITY TEST: CPT

## 2024-11-22 PROCEDURE — 71046 X-RAY EXAM CHEST 2 VIEWS: CPT

## 2024-11-22 PROCEDURE — 94726 PLETHYSMOGRAPHY LUNG VOLUMES: CPT

## 2024-11-22 RX ORDER — FLUTICASONE FUROATE, UMECLIDINIUM BROMIDE AND VILANTEROL TRIFENATATE 200; 62.5; 25 UG/1; UG/1; UG/1
200-62.5-25 POWDER RESPIRATORY (INHALATION)
Qty: 1 | Refills: 0 | Status: ACTIVE | COMMUNITY
Start: 2024-11-22 | End: 1900-01-01

## 2024-11-22 RX ORDER — ALBUTEROL SULFATE 90 UG/1
108 (90 BASE) INHALANT RESPIRATORY (INHALATION)
Qty: 6.7 | Refills: 2 | Status: ACTIVE | COMMUNITY
Start: 2024-11-22 | End: 1900-01-01

## 2024-11-25 RX ORDER — PREDNISONE 20 MG/1
20 TABLET ORAL DAILY
Qty: 10 | Refills: 0 | Status: ACTIVE | COMMUNITY
Start: 2024-11-25 | End: 1900-01-01

## 2024-12-03 ENCOUNTER — NON-APPOINTMENT (OUTPATIENT)
Age: 69
End: 2024-12-03

## 2024-12-06 ENCOUNTER — APPOINTMENT (OUTPATIENT)
Dept: PULMONOLOGY | Facility: CLINIC | Age: 69
End: 2024-12-06
Payer: MEDICARE

## 2024-12-06 VITALS
WEIGHT: 196 LBS | BODY MASS INDEX: 29.7 KG/M2 | DIASTOLIC BLOOD PRESSURE: 81 MMHG | OXYGEN SATURATION: 95 % | HEIGHT: 68 IN | SYSTOLIC BLOOD PRESSURE: 134 MMHG | HEART RATE: 82 BPM

## 2024-12-06 DIAGNOSIS — J44.9 CHRONIC OBSTRUCTIVE PULMONARY DISEASE, UNSPECIFIED: ICD-10-CM

## 2024-12-06 PROCEDURE — 99213 OFFICE O/P EST LOW 20 MIN: CPT

## 2024-12-06 PROCEDURE — G2211 COMPLEX E/M VISIT ADD ON: CPT

## 2024-12-10 ENCOUNTER — APPOINTMENT (OUTPATIENT)
Dept: CARDIOLOGY | Facility: CLINIC | Age: 69
End: 2024-12-10
Payer: MEDICARE

## 2024-12-10 PROCEDURE — 85610 PROTHROMBIN TIME: CPT | Mod: QW

## 2024-12-10 PROCEDURE — 93793 ANTICOAG MGMT PT WARFARIN: CPT

## 2024-12-11 LAB
INR PPP: 2.6 RATIO
QUALITY CONTROL: YES

## 2024-12-12 ENCOUNTER — APPOINTMENT (OUTPATIENT)
Dept: PULMONOLOGY | Facility: CLINIC | Age: 69
End: 2024-12-12

## 2024-12-19 ENCOUNTER — APPOINTMENT (OUTPATIENT)
Dept: PULMONOLOGY | Facility: CLINIC | Age: 69
End: 2024-12-19

## 2024-12-27 ENCOUNTER — RX RENEWAL (OUTPATIENT)
Age: 69
End: 2024-12-27

## 2025-01-09 ENCOUNTER — APPOINTMENT (OUTPATIENT)
Dept: CARDIOLOGY | Facility: CLINIC | Age: 70
End: 2025-01-09
Payer: MEDICARE

## 2025-01-09 VITALS — RESPIRATION RATE: 14 BRPM | WEIGHT: 196 LBS | BODY MASS INDEX: 29.7 KG/M2 | HEIGHT: 68 IN

## 2025-01-09 LAB
INR PPP: 3.1 RATIO
QUALITY CONTROL: YES

## 2025-01-09 PROCEDURE — 85610 PROTHROMBIN TIME: CPT | Mod: QW

## 2025-01-09 PROCEDURE — 93793 ANTICOAG MGMT PT WARFARIN: CPT

## 2025-02-04 ENCOUNTER — APPOINTMENT (OUTPATIENT)
Dept: CARDIOLOGY | Facility: CLINIC | Age: 70
End: 2025-02-04
Payer: MEDICARE

## 2025-02-04 VITALS — RESPIRATION RATE: 14 BRPM | HEIGHT: 68 IN | BODY MASS INDEX: 29.7 KG/M2 | WEIGHT: 196 LBS

## 2025-02-04 LAB
INR PPP: 3.2 RATIO
QUALITY CONTROL: YES

## 2025-02-04 PROCEDURE — 85610 PROTHROMBIN TIME: CPT | Mod: QW

## 2025-02-04 PROCEDURE — 93793 ANTICOAG MGMT PT WARFARIN: CPT

## 2025-02-20 ENCOUNTER — NON-APPOINTMENT (OUTPATIENT)
Age: 70
End: 2025-02-20

## 2025-02-20 ENCOUNTER — APPOINTMENT (OUTPATIENT)
Dept: CARDIOLOGY | Facility: CLINIC | Age: 70
End: 2025-02-20

## 2025-02-20 PROCEDURE — 93295 DEV INTERROG REMOTE 1/2/MLT: CPT

## 2025-02-20 PROCEDURE — 93296 REM INTERROG EVL PM/IDS: CPT

## 2025-03-04 ENCOUNTER — APPOINTMENT (OUTPATIENT)
Dept: CARDIOLOGY | Facility: CLINIC | Age: 70
End: 2025-03-04
Payer: MEDICARE

## 2025-03-04 VITALS — RESPIRATION RATE: 14 BRPM | HEIGHT: 68 IN | WEIGHT: 196 LBS | BODY MASS INDEX: 29.7 KG/M2

## 2025-03-04 LAB
INR PPP: 3.3 RATIO
QUALITY CONTROL: YES

## 2025-03-04 PROCEDURE — 93793 ANTICOAG MGMT PT WARFARIN: CPT

## 2025-03-04 PROCEDURE — 85610 PROTHROMBIN TIME: CPT | Mod: QW

## 2025-03-20 ENCOUNTER — APPOINTMENT (OUTPATIENT)
Dept: CARDIOLOGY | Facility: CLINIC | Age: 70
End: 2025-03-20
Payer: MEDICARE

## 2025-03-20 PROCEDURE — 93793 ANTICOAG MGMT PT WARFARIN: CPT

## 2025-03-20 PROCEDURE — 85610 PROTHROMBIN TIME: CPT | Mod: QW

## 2025-03-25 LAB
INR PPP: 2.5 RATIO
QUALITY CONTROL: YES

## 2025-04-08 ENCOUNTER — NON-APPOINTMENT (OUTPATIENT)
Age: 70
End: 2025-04-08

## 2025-04-10 ENCOUNTER — APPOINTMENT (OUTPATIENT)
Dept: CARDIOLOGY | Facility: CLINIC | Age: 70
End: 2025-04-10
Payer: MEDICARE

## 2025-04-10 PROCEDURE — 93793 ANTICOAG MGMT PT WARFARIN: CPT

## 2025-04-10 PROCEDURE — 85610 PROTHROMBIN TIME: CPT | Mod: QW

## 2025-04-11 LAB
INR PPP: 1.9 RATIO
QUALITY CONTROL: YES

## 2025-05-13 ENCOUNTER — APPOINTMENT (OUTPATIENT)
Dept: CARDIOLOGY | Facility: CLINIC | Age: 70
End: 2025-05-13
Payer: MEDICARE

## 2025-05-13 LAB
INR PPP: 3.2 RATIO
QUALITY CONTROL: YES

## 2025-05-13 PROCEDURE — 93793 ANTICOAG MGMT PT WARFARIN: CPT

## 2025-05-13 PROCEDURE — 85610 PROTHROMBIN TIME: CPT | Mod: QW

## 2025-05-23 ENCOUNTER — NON-APPOINTMENT (OUTPATIENT)
Age: 70
End: 2025-05-23

## 2025-05-23 ENCOUNTER — APPOINTMENT (OUTPATIENT)
Dept: CARDIOLOGY | Facility: CLINIC | Age: 70
End: 2025-05-23
Payer: MEDICARE

## 2025-05-23 PROCEDURE — 93295 DEV INTERROG REMOTE 1/2/MLT: CPT

## 2025-05-23 PROCEDURE — 93296 REM INTERROG EVL PM/IDS: CPT

## 2025-06-12 ENCOUNTER — APPOINTMENT (OUTPATIENT)
Dept: PULMONOLOGY | Facility: CLINIC | Age: 70
End: 2025-06-12
Payer: MEDICARE

## 2025-06-12 ENCOUNTER — APPOINTMENT (OUTPATIENT)
Dept: CARDIOLOGY | Facility: CLINIC | Age: 70
End: 2025-06-12
Payer: MEDICARE

## 2025-06-12 VITALS
WEIGHT: 203 LBS | SYSTOLIC BLOOD PRESSURE: 137 MMHG | HEIGHT: 66 IN | OXYGEN SATURATION: 98 % | DIASTOLIC BLOOD PRESSURE: 100 MMHG | BODY MASS INDEX: 32.62 KG/M2 | HEART RATE: 125 BPM

## 2025-06-12 LAB
INR PPP: 3.7 RATIO
QUALITY CONTROL: YES

## 2025-06-12 PROCEDURE — ZZZZZ: CPT

## 2025-06-12 PROCEDURE — 85610 PROTHROMBIN TIME: CPT | Mod: QW

## 2025-06-12 PROCEDURE — 94727 GAS DIL/WSHOT DETER LNG VOL: CPT

## 2025-06-12 PROCEDURE — 94729 DIFFUSING CAPACITY: CPT

## 2025-06-12 PROCEDURE — 94010 BREATHING CAPACITY TEST: CPT

## 2025-06-12 PROCEDURE — 93793 ANTICOAG MGMT PT WARFARIN: CPT

## 2025-06-12 PROCEDURE — 71046 X-RAY EXAM CHEST 2 VIEWS: CPT

## 2025-06-12 PROCEDURE — 99214 OFFICE O/P EST MOD 30 MIN: CPT | Mod: 25

## 2025-06-12 RX ORDER — PREDNISONE 20 MG/1
20 TABLET ORAL DAILY
Qty: 10 | Refills: 1 | Status: ACTIVE | COMMUNITY
Start: 2025-06-12 | End: 1900-01-01

## 2025-06-12 RX ORDER — IPRATROPIUM BROMIDE 0.5 MG/2.5ML
0.02 SOLUTION RESPIRATORY (INHALATION)
Qty: 900 | Refills: 1 | Status: ACTIVE | COMMUNITY
Start: 2025-06-12 | End: 1900-01-01

## 2025-06-12 RX ORDER — BLOOD-GLUCOSE METER
KIT MISCELLANEOUS
Qty: 1 | Refills: 0 | Status: ACTIVE | COMMUNITY
Start: 2025-06-12 | End: 1900-01-01

## 2025-06-17 ENCOUNTER — APPOINTMENT (OUTPATIENT)
Dept: CARDIOLOGY | Facility: CLINIC | Age: 70
End: 2025-06-17
Payer: MEDICARE

## 2025-06-17 VITALS
HEART RATE: 130 BPM | HEIGHT: 66 IN | SYSTOLIC BLOOD PRESSURE: 115 MMHG | DIASTOLIC BLOOD PRESSURE: 88 MMHG | OXYGEN SATURATION: 97 % | WEIGHT: 202 LBS | BODY MASS INDEX: 32.47 KG/M2

## 2025-06-17 VITALS — SYSTOLIC BLOOD PRESSURE: 120 MMHG | DIASTOLIC BLOOD PRESSURE: 82 MMHG

## 2025-06-17 PROBLEM — J44.1 COPD EXACERBATION: Status: ACTIVE | Noted: 2025-06-12

## 2025-06-17 PROCEDURE — 93000 ELECTROCARDIOGRAM COMPLETE: CPT

## 2025-06-17 PROCEDURE — 99214 OFFICE O/P EST MOD 30 MIN: CPT

## 2025-06-17 RX ORDER — FUROSEMIDE 40 MG/1
40 TABLET ORAL
Qty: 30 | Refills: 5 | Status: ACTIVE | COMMUNITY
Start: 2025-06-17 | End: 1900-01-01

## 2025-06-19 ENCOUNTER — APPOINTMENT (OUTPATIENT)
Dept: CARDIOLOGY | Facility: CLINIC | Age: 70
End: 2025-06-19
Payer: MEDICARE

## 2025-06-19 PROCEDURE — 93793 ANTICOAG MGMT PT WARFARIN: CPT

## 2025-06-19 PROCEDURE — 85610 PROTHROMBIN TIME: CPT | Mod: QW

## 2025-06-20 LAB
INR PPP: 1.8 RATIO
QUALITY CONTROL: YES

## 2025-06-26 ENCOUNTER — APPOINTMENT (OUTPATIENT)
Dept: PULMONOLOGY | Facility: CLINIC | Age: 70
End: 2025-06-26
Payer: MEDICARE

## 2025-06-26 ENCOUNTER — APPOINTMENT (OUTPATIENT)
Dept: CARDIOLOGY | Facility: CLINIC | Age: 70
End: 2025-06-26
Payer: MEDICARE

## 2025-06-26 VITALS
HEART RATE: 97 BPM | SYSTOLIC BLOOD PRESSURE: 89 MMHG | OXYGEN SATURATION: 93 % | DIASTOLIC BLOOD PRESSURE: 66 MMHG | HEIGHT: 66 IN | BODY MASS INDEX: 31.5 KG/M2 | WEIGHT: 196 LBS

## 2025-06-26 PROBLEM — B37.0 ORAL THRUSH: Status: ACTIVE | Noted: 2025-06-26

## 2025-06-26 PROCEDURE — 85610 PROTHROMBIN TIME: CPT | Mod: QW

## 2025-06-26 PROCEDURE — 93793 ANTICOAG MGMT PT WARFARIN: CPT

## 2025-06-26 PROCEDURE — 99214 OFFICE O/P EST MOD 30 MIN: CPT

## 2025-06-26 PROCEDURE — G2211 COMPLEX E/M VISIT ADD ON: CPT

## 2025-06-26 RX ORDER — NYSTATIN 100000 [USP'U]/ML
100000 SUSPENSION ORAL 3 TIMES DAILY
Qty: 105 | Refills: 2 | Status: ACTIVE | COMMUNITY
Start: 2025-06-26 | End: 1900-01-01

## 2025-06-27 LAB
ANION GAP SERPL CALC-SCNC: 14 MMOL/L
BUN SERPL-MCNC: 29 MG/DL
CALCIUM SERPL-MCNC: 9.2 MG/DL
CHLORIDE SERPL-SCNC: 98 MMOL/L
CO2 SERPL-SCNC: 25 MMOL/L
CREAT SERPL-MCNC: 1.15 MG/DL
EGFRCR SERPLBLD CKD-EPI 2021: 68 ML/MIN/1.73M2
GLUCOSE SERPL-MCNC: 158 MG/DL
INR PPP: 2 RATIO
POTASSIUM SERPL-SCNC: 4.6 MMOL/L
QUALITY CONTROL: YES
SODIUM SERPL-SCNC: 137 MMOL/L

## 2025-07-08 ENCOUNTER — APPOINTMENT (OUTPATIENT)
Dept: CARDIOLOGY | Facility: CLINIC | Age: 70
End: 2025-07-08
Payer: MEDICARE

## 2025-07-08 ENCOUNTER — RX RENEWAL (OUTPATIENT)
Age: 70
End: 2025-07-08

## 2025-07-08 VITALS
WEIGHT: 193 LBS | BODY MASS INDEX: 31.02 KG/M2 | OXYGEN SATURATION: 95 % | DIASTOLIC BLOOD PRESSURE: 68 MMHG | HEART RATE: 106 BPM | HEIGHT: 66 IN | SYSTOLIC BLOOD PRESSURE: 102 MMHG

## 2025-07-08 LAB
INR PPP: 3.4 RATIO
QUALITY CONTROL: YES

## 2025-07-08 PROCEDURE — 85610 PROTHROMBIN TIME: CPT | Mod: QW

## 2025-07-08 PROCEDURE — 99214 OFFICE O/P EST MOD 30 MIN: CPT

## 2025-07-08 PROCEDURE — 93000 ELECTROCARDIOGRAM COMPLETE: CPT

## 2025-07-08 RX ORDER — CARVEDILOL 6.25 MG/1
6.25 TABLET, FILM COATED ORAL
Qty: 180 | Refills: 3 | Status: COMPLETED | COMMUNITY
Start: 2025-07-08 | End: 2025-07-08

## 2025-07-08 RX ORDER — BUMETANIDE 2 MG/1
2 TABLET ORAL DAILY
Qty: 30 | Refills: 3 | Status: ACTIVE | COMMUNITY
Start: 2025-07-08 | End: 1900-01-01

## 2025-07-15 ENCOUNTER — APPOINTMENT (OUTPATIENT)
Dept: CARDIOLOGY | Facility: CLINIC | Age: 70
End: 2025-07-15
Payer: MEDICARE

## 2025-07-15 LAB
INR PPP: 5.4 RATIO
QUALITY CONTROL: YES

## 2025-07-15 PROCEDURE — 93793 ANTICOAG MGMT PT WARFARIN: CPT

## 2025-07-15 PROCEDURE — 85610 PROTHROMBIN TIME: CPT | Mod: QW

## 2025-07-18 ENCOUNTER — APPOINTMENT (OUTPATIENT)
Dept: CARDIOLOGY | Facility: CLINIC | Age: 70
End: 2025-07-18
Payer: MEDICARE

## 2025-07-18 LAB
INR PPP: 1.7 RATIO
QUALITY CONTROL: YES

## 2025-07-18 PROCEDURE — 93793 ANTICOAG MGMT PT WARFARIN: CPT

## 2025-07-18 PROCEDURE — 85610 PROTHROMBIN TIME: CPT | Mod: QW

## 2025-07-24 ENCOUNTER — APPOINTMENT (OUTPATIENT)
Dept: CARDIOLOGY | Facility: CLINIC | Age: 70
End: 2025-07-24

## 2025-07-29 ENCOUNTER — APPOINTMENT (OUTPATIENT)
Dept: CARDIOLOGY | Facility: CLINIC | Age: 70
End: 2025-07-29
Payer: MEDICARE

## 2025-07-29 VITALS — WEIGHT: 193 LBS | HEIGHT: 66 IN | BODY MASS INDEX: 31.02 KG/M2 | RESPIRATION RATE: 15 BRPM

## 2025-07-29 LAB
INR PPP: 2.1 RATIO
QUALITY CONTROL: YES

## 2025-07-29 PROCEDURE — 93793 ANTICOAG MGMT PT WARFARIN: CPT

## 2025-07-29 PROCEDURE — 85610 PROTHROMBIN TIME: CPT | Mod: QW

## 2025-08-05 ENCOUNTER — EMERGENCY (EMERGENCY)
Facility: HOSPITAL | Age: 70
LOS: 1 days | End: 2025-08-05
Attending: EMERGENCY MEDICINE | Admitting: EMERGENCY MEDICINE
Payer: MEDICARE

## 2025-08-05 VITALS
WEIGHT: 160.06 LBS | OXYGEN SATURATION: 98 % | DIASTOLIC BLOOD PRESSURE: 98 MMHG | RESPIRATION RATE: 16 BRPM | HEIGHT: 65 IN | TEMPERATURE: 97 F | SYSTOLIC BLOOD PRESSURE: 136 MMHG | HEART RATE: 97 BPM

## 2025-08-05 DIAGNOSIS — Z95.810 PRESENCE OF AUTOMATIC (IMPLANTABLE) CARDIAC DEFIBRILLATOR: Chronic | ICD-10-CM

## 2025-08-05 LAB
APPEARANCE UR: CLEAR — SIGNIFICANT CHANGE UP
BILIRUB UR-MCNC: NEGATIVE — SIGNIFICANT CHANGE UP
COLOR SPEC: YELLOW — SIGNIFICANT CHANGE UP
DIFF PNL FLD: NEGATIVE — SIGNIFICANT CHANGE UP
GLUCOSE UR QL: NEGATIVE MG/DL — SIGNIFICANT CHANGE UP
KETONES UR QL: NEGATIVE MG/DL — SIGNIFICANT CHANGE UP
LEUKOCYTE ESTERASE UR-ACNC: NEGATIVE — SIGNIFICANT CHANGE UP
NITRITE UR-MCNC: NEGATIVE — SIGNIFICANT CHANGE UP
PH UR: 6.5 — SIGNIFICANT CHANGE UP (ref 5–8)
PROT UR-MCNC: NEGATIVE MG/DL — SIGNIFICANT CHANGE UP
SP GR SPEC: 1.01 — SIGNIFICANT CHANGE UP (ref 1–1.03)
UROBILINOGEN FLD QL: 0.2 MG/DL — SIGNIFICANT CHANGE UP (ref 0.2–1)

## 2025-08-05 PROCEDURE — 99283 EMERGENCY DEPT VISIT LOW MDM: CPT | Mod: 25

## 2025-08-05 PROCEDURE — 99284 EMERGENCY DEPT VISIT MOD MDM: CPT

## 2025-08-05 PROCEDURE — 81003 URINALYSIS AUTO W/O SCOPE: CPT

## 2025-08-05 PROCEDURE — 51798 US URINE CAPACITY MEASURE: CPT

## 2025-08-05 PROCEDURE — 51702 INSERT TEMP BLADDER CATH: CPT

## 2025-08-05 RX ORDER — CEFUROXIME SODIUM 1.5 G
1 VIAL (EA) INJECTION
Qty: 20 | Refills: 0
Start: 2025-08-05 | End: 2025-08-14

## 2025-08-05 RX ORDER — CEFUROXIME SODIUM 1.5 G
500 VIAL (EA) INJECTION EVERY 12 HOURS
Refills: 0 | Status: ACTIVE | OUTPATIENT
Start: 2025-08-05 | End: 2026-07-04

## 2025-08-05 RX ADMIN — Medication 500 MILLIGRAM(S): at 23:24

## 2025-08-06 ENCOUNTER — APPOINTMENT (OUTPATIENT)
Dept: UROLOGY | Facility: CLINIC | Age: 70
End: 2025-08-06

## 2025-08-06 ENCOUNTER — LABORATORY RESULT (OUTPATIENT)
Age: 70
End: 2025-08-06

## 2025-08-06 VITALS
OXYGEN SATURATION: 97 % | TEMPERATURE: 97.6 F | DIASTOLIC BLOOD PRESSURE: 82 MMHG | RESPIRATION RATE: 16 BRPM | HEART RATE: 97 BPM | SYSTOLIC BLOOD PRESSURE: 123 MMHG | HEIGHT: 67 IN | WEIGHT: 185 LBS | BODY MASS INDEX: 29.03 KG/M2

## 2025-08-06 DIAGNOSIS — R33.9 RETENTION OF URINE, UNSPECIFIED: ICD-10-CM

## 2025-08-06 DIAGNOSIS — Z86.73 PERSONAL HISTORY OF TRANSIENT ISCHEMIC ATTACK (TIA), AND CEREBRAL INFARCTION W/OUT RESIDUAL DEFICITS: ICD-10-CM

## 2025-08-06 DIAGNOSIS — R30.0 DYSURIA: ICD-10-CM

## 2025-08-06 PROCEDURE — A4216: CPT | Mod: NC

## 2025-08-06 PROCEDURE — 51700 IRRIGATION OF BLADDER: CPT

## 2025-08-06 PROCEDURE — 99204 OFFICE O/P NEW MOD 45 MIN: CPT | Mod: 25

## 2025-08-06 RX ORDER — ATORVASTATIN CALCIUM 80 MG/1
TABLET, FILM COATED ORAL
Refills: 0 | Status: ACTIVE | COMMUNITY

## 2025-08-06 RX ORDER — TAMSULOSIN HYDROCHLORIDE 0.4 MG/1
0.4 CAPSULE ORAL
Qty: 30 | Refills: 0 | Status: ACTIVE | COMMUNITY
Start: 2025-08-06 | End: 1900-01-01

## 2025-08-06 RX ORDER — RAMIPRIL 5 MG/1
CAPSULE ORAL
Refills: 0 | Status: ACTIVE | COMMUNITY

## 2025-08-06 RX ORDER — CARVEDILOL 3.12 MG/1
TABLET, FILM COATED ORAL
Refills: 0 | Status: ACTIVE | COMMUNITY

## 2025-08-06 RX ORDER — ASPIRIN 325 MG/1
TABLET, FILM COATED ORAL
Refills: 0 | Status: ACTIVE | COMMUNITY

## 2025-08-06 RX ORDER — WARFARIN 4 MG/1
TABLET ORAL
Refills: 0 | Status: ACTIVE | COMMUNITY

## 2025-08-07 LAB
APPEARANCE: CLEAR
BILIRUBIN URINE: NEGATIVE
BLOOD URINE: ABNORMAL
COLOR: YELLOW
GLUCOSE QUALITATIVE U: NEGATIVE MG/DL
KETONES URINE: NEGATIVE MG/DL
LEUKOCYTE ESTERASE URINE: ABNORMAL
NITRITE URINE: NEGATIVE
PH URINE: 5.5
PROTEIN URINE: 30 MG/DL
SPECIFIC GRAVITY URINE: 1.01
UROBILINOGEN URINE: 0.2 MG/DL

## 2025-08-12 ENCOUNTER — APPOINTMENT (OUTPATIENT)
Dept: CARDIOLOGY | Facility: CLINIC | Age: 70
End: 2025-08-12

## 2025-08-12 ENCOUNTER — APPOINTMENT (OUTPATIENT)
Dept: UROLOGY | Facility: CLINIC | Age: 70
End: 2025-08-12

## 2025-08-12 LAB — URINE CYTOLOGY: NORMAL

## 2025-08-12 PROCEDURE — 82962 GLUCOSE BLOOD TEST: CPT

## 2025-08-12 PROCEDURE — 51703 INSERT BLADDER CATH COMPLEX: CPT

## 2025-08-12 PROCEDURE — 99204 OFFICE O/P NEW MOD 45 MIN: CPT | Mod: 25

## 2025-08-13 ENCOUNTER — APPOINTMENT (OUTPATIENT)
Dept: UROLOGY | Facility: CLINIC | Age: 70
End: 2025-08-13
Payer: MEDICARE

## 2025-08-13 LAB
APPEARANCE: CLEAR
BACTERIA: NEGATIVE /HPF
BILIRUBIN URINE: NEGATIVE
BLOOD URINE: ABNORMAL
CAST: 2 /LPF
COLOR: YELLOW
EPITHELIAL CELLS: 0 /HPF
GLUCOSE BLDC GLUCOMTR-MCNC: 121
GLUCOSE QUALITATIVE U: NEGATIVE MG/DL
KETONES URINE: NEGATIVE MG/DL
LEUKOCYTE ESTERASE URINE: ABNORMAL
MICROSCOPIC-UA: NORMAL
NITRITE URINE: NEGATIVE
PH URINE: 6.5
PROTEIN URINE: 30 MG/DL
RED BLOOD CELLS URINE: 18 /HPF
REVIEW: NORMAL
SPECIFIC GRAVITY URINE: 1.01
UROBILINOGEN URINE: 0.2 MG/DL
WHITE BLOOD CELLS URINE: 11 /HPF

## 2025-08-13 PROCEDURE — A4216: CPT | Mod: NC

## 2025-08-13 PROCEDURE — 99213 OFFICE O/P EST LOW 20 MIN: CPT | Mod: 25

## 2025-08-13 PROCEDURE — 99203 OFFICE O/P NEW LOW 30 MIN: CPT | Mod: 25

## 2025-08-13 PROCEDURE — 51700 IRRIGATION OF BLADDER: CPT

## 2025-08-14 ENCOUNTER — NON-APPOINTMENT (OUTPATIENT)
Age: 70
End: 2025-08-14

## 2025-08-14 LAB
APPEARANCE: CLEAR
BACTERIA UR CULT: NORMAL
BACTERIA: NEGATIVE /HPF
BILIRUBIN URINE: NEGATIVE
BLOOD URINE: ABNORMAL
CAST: 0 /LPF
COLOR: YELLOW
EPITHELIAL CELLS: 1 /HPF
GLUCOSE QUALITATIVE U: NEGATIVE MG/DL
KETONES URINE: NEGATIVE MG/DL
LEUKOCYTE ESTERASE URINE: ABNORMAL
MICROSCOPIC-UA: NORMAL
NITRITE URINE: NEGATIVE
PH URINE: 6.5
PROTEIN URINE: 30 MG/DL
RED BLOOD CELLS URINE: 46 /HPF
SPECIFIC GRAVITY URINE: 1
UROBILINOGEN URINE: 0.2 MG/DL
WHITE BLOOD CELLS URINE: 4 /HPF

## 2025-08-18 ENCOUNTER — APPOINTMENT (OUTPATIENT)
Dept: UROLOGY | Facility: CLINIC | Age: 70
End: 2025-08-18

## 2025-08-19 DIAGNOSIS — R30.0 DYSURIA: ICD-10-CM

## 2025-08-19 RX ORDER — PHENAZOPYRIDINE HYDROCHLORIDE 200 MG/1
200 TABLET ORAL 3 TIMES DAILY
Qty: 9 | Refills: 0 | Status: ACTIVE | COMMUNITY
Start: 2025-08-19 | End: 1900-01-01

## 2025-08-22 ENCOUNTER — APPOINTMENT (OUTPATIENT)
Dept: CARDIOLOGY | Facility: CLINIC | Age: 70
End: 2025-08-22

## 2025-08-22 PROCEDURE — 93296 REM INTERROG EVL PM/IDS: CPT

## 2025-08-22 PROCEDURE — 93295 DEV INTERROG REMOTE 1/2/MLT: CPT

## 2025-08-25 ENCOUNTER — APPOINTMENT (OUTPATIENT)
Dept: UROLOGY | Facility: CLINIC | Age: 70
End: 2025-08-25
Payer: MEDICARE

## 2025-08-25 ENCOUNTER — LABORATORY RESULT (OUTPATIENT)
Age: 70
End: 2025-08-25

## 2025-08-25 ENCOUNTER — RESULT CHARGE (OUTPATIENT)
Age: 70
End: 2025-08-25

## 2025-08-25 VITALS
DIASTOLIC BLOOD PRESSURE: 67 MMHG | OXYGEN SATURATION: 97 % | RESPIRATION RATE: 16 BRPM | HEART RATE: 120 BPM | SYSTOLIC BLOOD PRESSURE: 91 MMHG

## 2025-08-25 DIAGNOSIS — N34.2 OTHER URETHRITIS: ICD-10-CM

## 2025-08-25 LAB
BILIRUB UR QL STRIP: NEGATIVE
CLARITY UR: CLEAR
COLLECTION METHOD: NORMAL
GLUCOSE UR-MCNC: NEGATIVE
HCG UR QL: 0.2 EU/DL
HGB UR QL STRIP.AUTO: NORMAL
KETONES UR-MCNC: NEGATIVE
LEUKOCYTE ESTERASE UR QL STRIP: NORMAL
NITRITE UR QL STRIP: NEGATIVE
PH UR STRIP: 5
PROT UR STRIP-MCNC: NEGATIVE
SP GR UR STRIP: 1

## 2025-08-25 PROCEDURE — 99214 OFFICE O/P EST MOD 30 MIN: CPT | Mod: 25

## 2025-08-25 PROCEDURE — 51703 INSERT BLADDER CATH COMPLEX: CPT

## 2025-08-25 PROCEDURE — 51798 US URINE CAPACITY MEASURE: CPT

## 2025-08-25 RX ORDER — DOXYCYCLINE 100 MG/1
100 CAPSULE ORAL
Qty: 20 | Refills: 0 | Status: ACTIVE | COMMUNITY
Start: 2025-08-25 | End: 1900-01-01

## 2025-08-25 RX ORDER — CIPROFLOXACIN HYDROCHLORIDE 500 MG/1
500 TABLET, FILM COATED ORAL
Qty: 1 | Refills: 0 | Status: ACTIVE | OUTPATIENT
Start: 2025-08-25

## 2025-08-25 RX ORDER — CIPROFLOXACIN HYDROCHLORIDE 500 MG/1
500 TABLET, FILM COATED ORAL
Refills: 0 | Status: COMPLETED | OUTPATIENT
Start: 2025-08-25

## 2025-08-25 RX ADMIN — CIPROFLOXACIN 0 MG: 500 TABLET ORAL at 00:00

## 2025-08-26 ENCOUNTER — APPOINTMENT (OUTPATIENT)
Dept: CARDIOLOGY | Facility: CLINIC | Age: 70
End: 2025-08-26

## 2025-08-26 LAB
APPEARANCE: CLEAR
BILIRUBIN URINE: NEGATIVE
BLOOD URINE: ABNORMAL
COLOR: YELLOW
GLUCOSE QUALITATIVE U: NEGATIVE MG/DL
KETONES URINE: NEGATIVE MG/DL
LEUKOCYTE ESTERASE URINE: ABNORMAL
NITRITE URINE: NEGATIVE
PH URINE: 6.5
PROTEIN URINE: NEGATIVE MG/DL
SPECIFIC GRAVITY URINE: <1.005
UROBILINOGEN URINE: 0.2 MG/DL

## 2025-08-28 ENCOUNTER — APPOINTMENT (OUTPATIENT)
Dept: UROLOGY | Facility: CLINIC | Age: 70
End: 2025-08-28

## 2025-08-28 VITALS
TEMPERATURE: 98.6 F | DIASTOLIC BLOOD PRESSURE: 74 MMHG | OXYGEN SATURATION: 98 % | HEART RATE: 120 BPM | SYSTOLIC BLOOD PRESSURE: 102 MMHG

## 2025-08-28 DIAGNOSIS — R33.9 RETENTION OF URINE, UNSPECIFIED: ICD-10-CM

## 2025-08-28 DIAGNOSIS — N40.0 BENIGN PROSTATIC HYPERPLASIA WITHOUT LOWER URINARY TRACT SYMPMS: ICD-10-CM

## 2025-08-28 PROCEDURE — 52000 CYSTOURETHROSCOPY: CPT

## 2025-08-28 PROCEDURE — 76872 US TRANSRECTAL: CPT

## 2025-08-28 PROCEDURE — 51741 ELECTRO-UROFLOWMETRY FIRST: CPT

## 2025-08-28 PROCEDURE — 51798 US URINE CAPACITY MEASURE: CPT | Mod: XU

## 2025-08-28 PROCEDURE — 51784 ANAL/URINARY MUSCLE STUDY: CPT

## 2025-08-28 PROCEDURE — 99214 OFFICE O/P EST MOD 30 MIN: CPT | Mod: 25

## 2025-08-28 PROCEDURE — 51728 CYSTOMETROGRAM W/VP: CPT

## 2025-08-28 PROCEDURE — 51797 INTRAABDOMINAL PRESSURE TEST: CPT

## 2025-08-28 RX ORDER — FINASTERIDE 5 MG/1
5 TABLET, FILM COATED ORAL DAILY
Qty: 30 | Refills: 0 | Status: ACTIVE | COMMUNITY
Start: 2025-08-28 | End: 1900-01-01

## 2025-08-29 ENCOUNTER — APPOINTMENT (OUTPATIENT)
Dept: UROLOGY | Facility: CLINIC | Age: 70
End: 2025-08-29

## 2025-08-29 LAB
PSA FREE FLD-MCNC: 23 %
PSA FREE SERPL-MCNC: 3.46 NG/ML
PSA SERPL-MCNC: 15.2 NG/ML

## 2025-08-29 PROCEDURE — 51798 US URINE CAPACITY MEASURE: CPT

## 2025-08-29 PROCEDURE — 99214 OFFICE O/P EST MOD 30 MIN: CPT

## 2025-08-29 PROCEDURE — 51741 ELECTRO-UROFLOWMETRY FIRST: CPT

## 2025-08-30 ENCOUNTER — EMERGENCY (EMERGENCY)
Facility: HOSPITAL | Age: 70
LOS: 1 days | End: 2025-08-30
Attending: EMERGENCY MEDICINE | Admitting: EMERGENCY MEDICINE
Payer: MEDICARE

## 2025-08-30 VITALS
HEIGHT: 65 IN | SYSTOLIC BLOOD PRESSURE: 87 MMHG | OXYGEN SATURATION: 100 % | TEMPERATURE: 98 F | WEIGHT: 169.98 LBS | HEART RATE: 87 BPM | DIASTOLIC BLOOD PRESSURE: 64 MMHG | RESPIRATION RATE: 18 BRPM

## 2025-08-30 VITALS
DIASTOLIC BLOOD PRESSURE: 74 MMHG | SYSTOLIC BLOOD PRESSURE: 106 MMHG | TEMPERATURE: 98 F | OXYGEN SATURATION: 93 % | RESPIRATION RATE: 17 BRPM | HEART RATE: 100 BPM

## 2025-08-30 DIAGNOSIS — Z95.810 PRESENCE OF AUTOMATIC (IMPLANTABLE) CARDIAC DEFIBRILLATOR: Chronic | ICD-10-CM

## 2025-08-30 PROCEDURE — 99283 EMERGENCY DEPT VISIT LOW MDM: CPT | Mod: 25

## 2025-08-30 PROCEDURE — 99284 EMERGENCY DEPT VISIT MOD MDM: CPT

## 2025-08-30 PROCEDURE — 51702 INSERT TEMP BLADDER CATH: CPT

## 2025-08-30 RX ORDER — LIDOCAINE HCL/PF 10 MG/ML
5 VIAL (ML) INJECTION ONCE
Refills: 0 | Status: COMPLETED | OUTPATIENT
Start: 2025-08-30 | End: 2025-08-30

## 2025-08-30 RX ADMIN — Medication 5 MILLILITER(S): at 12:18

## 2025-09-04 ENCOUNTER — APPOINTMENT (OUTPATIENT)
Dept: CARDIOLOGY | Facility: CLINIC | Age: 70
End: 2025-09-04
Payer: MEDICARE

## 2025-09-04 VITALS — BODY MASS INDEX: 29.03 KG/M2 | HEIGHT: 67 IN | RESPIRATION RATE: 16 BRPM | WEIGHT: 185 LBS

## 2025-09-04 LAB
INR PPP: 4.6 RATIO
QUALITY CONTROL: YES

## 2025-09-04 PROCEDURE — 85610 PROTHROMBIN TIME: CPT | Mod: QW

## 2025-09-04 PROCEDURE — 93793 ANTICOAG MGMT PT WARFARIN: CPT

## 2025-09-08 ENCOUNTER — APPOINTMENT (OUTPATIENT)
Dept: CARDIOLOGY | Facility: CLINIC | Age: 70
End: 2025-09-08
Payer: MEDICARE

## 2025-09-08 VITALS
WEIGHT: 170 LBS | DIASTOLIC BLOOD PRESSURE: 62 MMHG | HEART RATE: 117 BPM | HEIGHT: 67 IN | OXYGEN SATURATION: 98 % | BODY MASS INDEX: 26.68 KG/M2 | SYSTOLIC BLOOD PRESSURE: 93 MMHG

## 2025-09-08 DIAGNOSIS — Z01.810 ENCOUNTER FOR PREPROCEDURAL CARDIOVASCULAR EXAMINATION: ICD-10-CM

## 2025-09-08 DIAGNOSIS — N40.0 BENIGN PROSTATIC HYPERPLASIA WITHOUT LOWER URINARY TRACT SYMPMS: ICD-10-CM

## 2025-09-08 DIAGNOSIS — I25.10 ATHEROSCLEROTIC HEART DISEASE OF NATIVE CORONARY ARTERY W/OUT ANGINA PECTORIS: ICD-10-CM

## 2025-09-08 LAB
INR PPP: 2.7 RATIO
QUALITY CONTROL: YES

## 2025-09-08 PROCEDURE — G2211 COMPLEX E/M VISIT ADD ON: CPT

## 2025-09-08 PROCEDURE — 85610 PROTHROMBIN TIME: CPT | Mod: QW

## 2025-09-08 PROCEDURE — 93793 ANTICOAG MGMT PT WARFARIN: CPT

## 2025-09-08 PROCEDURE — 99214 OFFICE O/P EST MOD 30 MIN: CPT

## 2025-09-08 PROCEDURE — 93000 ELECTROCARDIOGRAM COMPLETE: CPT | Mod: NC

## 2025-09-11 ENCOUNTER — APPOINTMENT (OUTPATIENT)
Dept: UROLOGY | Facility: CLINIC | Age: 70
End: 2025-09-11

## 2025-09-11 ENCOUNTER — APPOINTMENT (OUTPATIENT)
Age: 70
End: 2025-09-11
Payer: MEDICARE

## 2025-09-11 ENCOUNTER — APPOINTMENT (OUTPATIENT)
Age: 70
End: 2025-09-11

## 2025-09-11 VITALS
RESPIRATION RATE: 12 BRPM | BODY MASS INDEX: 27.31 KG/M2 | HEIGHT: 67 IN | DIASTOLIC BLOOD PRESSURE: 62 MMHG | TEMPERATURE: 97.7 F | OXYGEN SATURATION: 94 % | SYSTOLIC BLOOD PRESSURE: 87 MMHG | WEIGHT: 174 LBS | HEART RATE: 124 BPM

## 2025-09-11 VITALS — DIASTOLIC BLOOD PRESSURE: 69 MMHG | SYSTOLIC BLOOD PRESSURE: 85 MMHG

## 2025-09-11 DIAGNOSIS — R33.9 RETENTION OF URINE, UNSPECIFIED: ICD-10-CM

## 2025-09-11 DIAGNOSIS — Z82.49 FAMILY HISTORY OF ISCHEMIC HEART DISEASE AND OTHER DISEASES OF THE CIRCULATORY SYSTEM: ICD-10-CM

## 2025-09-11 DIAGNOSIS — Z01.818 ENCOUNTER FOR OTHER PREPROCEDURAL EXAMINATION: ICD-10-CM

## 2025-09-11 DIAGNOSIS — I10 ESSENTIAL (PRIMARY) HYPERTENSION: ICD-10-CM

## 2025-09-11 DIAGNOSIS — Z86.73 PERSONAL HISTORY OF TRANSIENT ISCHEMIC ATTACK (TIA), AND CEREBRAL INFARCTION W/OUT RESIDUAL DEFICITS: ICD-10-CM

## 2025-09-11 DIAGNOSIS — E78.5 HYPERLIPIDEMIA, UNSPECIFIED: ICD-10-CM

## 2025-09-11 DIAGNOSIS — J44.9 CHRONIC OBSTRUCTIVE PULMONARY DISEASE, UNSPECIFIED: ICD-10-CM

## 2025-09-11 DIAGNOSIS — I51.3 INTRACARDIAC THROMBOSIS, NOT ELSEWHERE CLASSIFIED: ICD-10-CM

## 2025-09-11 PROBLEM — F17.200 NICOTINE DEPENDENCE, UNSPECIFIED, UNCOMPLICATED: Chronic | Status: INACTIVE | Noted: 2017-10-31 | Resolved: 2025-09-10

## 2025-09-11 PROBLEM — I42.9 CARDIOMYOPATHY, UNSPECIFIED: Chronic | Status: INACTIVE | Noted: 2017-10-31 | Resolved: 2025-09-10

## 2025-09-11 PROCEDURE — 99203 OFFICE O/P NEW LOW 30 MIN: CPT

## 2025-09-11 PROCEDURE — G2211 COMPLEX E/M VISIT ADD ON: CPT

## 2025-09-12 ENCOUNTER — NON-APPOINTMENT (OUTPATIENT)
Age: 70
End: 2025-09-12

## 2025-09-12 PROBLEM — N40.0 BENIGN PROSTATIC HYPERPLASIA WITHOUT LOWER URINARY TRACT SYMPTOMS: Chronic | Status: ACTIVE | Noted: 2025-09-10

## 2025-09-12 PROBLEM — I10 ESSENTIAL (PRIMARY) HYPERTENSION: Chronic | Status: ACTIVE | Noted: 2025-09-10

## 2025-09-12 PROBLEM — I25.5 ISCHEMIC CARDIOMYOPATHY: Chronic | Status: ACTIVE | Noted: 2025-09-10

## 2025-09-13 PROBLEM — I25.10 ATHEROSCLEROTIC HEART DISEASE OF NATIVE CORONARY ARTERY WITHOUT ANGINA PECTORIS: Chronic | Status: ACTIVE | Noted: 2025-09-10

## 2025-09-13 PROBLEM — E78.5 HYPERLIPIDEMIA, UNSPECIFIED: Chronic | Status: ACTIVE | Noted: 2025-09-10

## 2025-09-13 PROBLEM — J44.9 CHRONIC OBSTRUCTIVE PULMONARY DISEASE, UNSPECIFIED: Chronic | Status: ACTIVE | Noted: 2025-09-10

## 2025-09-16 ENCOUNTER — APPOINTMENT (OUTPATIENT)
Dept: UROLOGY | Facility: HOSPITAL | Age: 70
End: 2025-09-16

## 2025-09-16 ENCOUNTER — APPOINTMENT (OUTPATIENT)
Dept: CARDIOLOGY | Facility: CLINIC | Age: 70
End: 2025-09-16
Payer: MEDICARE

## 2025-09-16 VITALS — RESPIRATION RATE: 12 BRPM | WEIGHT: 174 LBS | HEIGHT: 67 IN | BODY MASS INDEX: 27.31 KG/M2

## 2025-09-16 LAB
INR PPP: 1.8 RATIO
QUALITY CONTROL: YES

## 2025-09-16 PROCEDURE — 93793 ANTICOAG MGMT PT WARFARIN: CPT

## 2025-09-16 PROCEDURE — 85610 PROTHROMBIN TIME: CPT | Mod: QW

## 2025-09-23 PROBLEM — M79.89 SWELLING OF LOWER EXTREMITY: Status: ACTIVE | Noted: 2025-09-23

## 2025-09-23 PROBLEM — I95.9 HYPOTENSION, UNSPECIFIED HYPOTENSION TYPE: Status: ACTIVE | Noted: 2025-09-23

## 2025-09-26 PROBLEM — B37.9 YEAST INFECTION: Status: ACTIVE | Noted: 2025-09-26

## 2025-09-26 PROBLEM — R97.20 ELEVATED PSA: Status: ACTIVE | Noted: 2025-09-26
